# Patient Record
Sex: MALE | Race: WHITE | NOT HISPANIC OR LATINO | Employment: FULL TIME | URBAN - METROPOLITAN AREA
[De-identification: names, ages, dates, MRNs, and addresses within clinical notes are randomized per-mention and may not be internally consistent; named-entity substitution may affect disease eponyms.]

---

## 2017-05-04 ENCOUNTER — APPOINTMENT (OUTPATIENT)
Dept: LAB | Facility: CLINIC | Age: 42
End: 2017-05-04
Payer: COMMERCIAL

## 2017-05-04 ENCOUNTER — TRANSCRIBE ORDERS (OUTPATIENT)
Dept: LAB | Facility: CLINIC | Age: 42
End: 2017-05-04

## 2017-05-04 DIAGNOSIS — E78.6 LIPOPROTEIN DEFICIENCY: ICD-10-CM

## 2017-05-04 LAB
ALT SERPL W P-5'-P-CCNC: 21 U/L (ref 12–78)
AST SERPL W P-5'-P-CCNC: 10 U/L (ref 5–45)
CHOLEST SERPL-MCNC: 178 MG/DL (ref 50–200)
HDLC SERPL-MCNC: 41 MG/DL (ref 40–60)
LDLC SERPL CALC-MCNC: 112 MG/DL (ref 0–100)
TRIGL SERPL-MCNC: 127 MG/DL

## 2017-05-04 PROCEDURE — 80061 LIPID PANEL: CPT

## 2017-05-04 PROCEDURE — 84450 TRANSFERASE (AST) (SGOT): CPT

## 2017-05-04 PROCEDURE — 84460 ALANINE AMINO (ALT) (SGPT): CPT

## 2017-05-04 PROCEDURE — 36415 COLL VENOUS BLD VENIPUNCTURE: CPT

## 2017-05-08 ENCOUNTER — ALLSCRIPTS OFFICE VISIT (OUTPATIENT)
Dept: OTHER | Facility: OTHER | Age: 42
End: 2017-05-08

## 2017-05-08 DIAGNOSIS — R53.81 OTHER MALAISE: ICD-10-CM

## 2017-05-08 DIAGNOSIS — R53.83 OTHER FATIGUE: ICD-10-CM

## 2017-05-17 ENCOUNTER — GENERIC CONVERSION - ENCOUNTER (OUTPATIENT)
Dept: OTHER | Facility: OTHER | Age: 42
End: 2017-05-17

## 2017-05-17 ENCOUNTER — TRANSCRIBE ORDERS (OUTPATIENT)
Dept: LAB | Facility: CLINIC | Age: 42
End: 2017-05-17

## 2017-05-17 ENCOUNTER — APPOINTMENT (OUTPATIENT)
Dept: LAB | Facility: CLINIC | Age: 42
End: 2017-05-17
Payer: COMMERCIAL

## 2017-05-17 DIAGNOSIS — R53.81 OTHER MALAISE: ICD-10-CM

## 2017-05-17 DIAGNOSIS — R53.83 OTHER FATIGUE: ICD-10-CM

## 2017-05-17 LAB
ANION GAP SERPL CALCULATED.3IONS-SCNC: 6 MMOL/L (ref 4–13)
BASOPHILS # BLD AUTO: 0.04 THOUSANDS/ΜL (ref 0–0.1)
BASOPHILS NFR BLD AUTO: 1 % (ref 0–1)
BUN SERPL-MCNC: 14 MG/DL (ref 5–25)
CALCIUM SERPL-MCNC: 9.5 MG/DL (ref 8.3–10.1)
CHLORIDE SERPL-SCNC: 107 MMOL/L (ref 100–108)
CO2 SERPL-SCNC: 29 MMOL/L (ref 21–32)
CREAT SERPL-MCNC: 0.94 MG/DL (ref 0.6–1.3)
EOSINOPHIL # BLD AUTO: 0.28 THOUSAND/ΜL (ref 0–0.61)
EOSINOPHIL NFR BLD AUTO: 4 % (ref 0–6)
ERYTHROCYTE [DISTWIDTH] IN BLOOD BY AUTOMATED COUNT: 12.7 % (ref 11.6–15.1)
GFR SERPL CREATININE-BSD FRML MDRD: >60 ML/MIN/1.73SQ M
GLUCOSE P FAST SERPL-MCNC: 69 MG/DL (ref 65–99)
HCT VFR BLD AUTO: 46.8 % (ref 36.5–49.3)
HGB BLD-MCNC: 16.1 G/DL (ref 12–17)
LYMPHOCYTES # BLD AUTO: 1.9 THOUSANDS/ΜL (ref 0.6–4.47)
LYMPHOCYTES NFR BLD AUTO: 29 % (ref 14–44)
MCH RBC QN AUTO: 29.7 PG (ref 26.8–34.3)
MCHC RBC AUTO-ENTMCNC: 34.4 G/DL (ref 31.4–37.4)
MCV RBC AUTO: 86 FL (ref 82–98)
MONOCYTES # BLD AUTO: 0.33 THOUSAND/ΜL (ref 0.17–1.22)
MONOCYTES NFR BLD AUTO: 5 % (ref 4–12)
NEUTROPHILS # BLD AUTO: 4.09 THOUSANDS/ΜL (ref 1.85–7.62)
NEUTS SEG NFR BLD AUTO: 61 % (ref 43–75)
NRBC BLD AUTO-RTO: 0 /100 WBCS
PLATELET # BLD AUTO: 203 THOUSANDS/UL (ref 149–390)
PMV BLD AUTO: 11.3 FL (ref 8.9–12.7)
POTASSIUM SERPL-SCNC: 3.7 MMOL/L (ref 3.5–5.3)
RBC # BLD AUTO: 5.43 MILLION/UL (ref 3.88–5.62)
SODIUM SERPL-SCNC: 142 MMOL/L (ref 136–145)
TSH SERPL DL<=0.05 MIU/L-ACNC: 1.87 UIU/ML (ref 0.36–3.74)
WBC # BLD AUTO: 6.65 THOUSAND/UL (ref 4.31–10.16)

## 2017-05-17 PROCEDURE — 84403 ASSAY OF TOTAL TESTOSTERONE: CPT

## 2017-05-17 PROCEDURE — 36415 COLL VENOUS BLD VENIPUNCTURE: CPT

## 2017-05-17 PROCEDURE — 80048 BASIC METABOLIC PNL TOTAL CA: CPT

## 2017-05-17 PROCEDURE — 84402 ASSAY OF FREE TESTOSTERONE: CPT

## 2017-05-17 PROCEDURE — 85025 COMPLETE CBC W/AUTO DIFF WBC: CPT

## 2017-05-17 PROCEDURE — 84443 ASSAY THYROID STIM HORMONE: CPT

## 2017-05-19 ENCOUNTER — GENERIC CONVERSION - ENCOUNTER (OUTPATIENT)
Dept: OTHER | Facility: OTHER | Age: 42
End: 2017-05-19

## 2017-05-19 LAB
TESTOST FREE SERPL-MCNC: 7.6 PG/ML (ref 6.8–21.5)
TESTOST SERPL-MCNC: 340 NG/DL (ref 348–1197)
TESTOSTERONE COMMENT: ABNORMAL

## 2017-05-22 ENCOUNTER — ALLSCRIPTS OFFICE VISIT (OUTPATIENT)
Dept: OTHER | Facility: OTHER | Age: 42
End: 2017-05-22

## 2017-05-22 PROCEDURE — 88305 TISSUE EXAM BY PATHOLOGIST: CPT | Performed by: FAMILY MEDICINE

## 2017-05-23 ENCOUNTER — LAB REQUISITION (OUTPATIENT)
Dept: LAB | Facility: HOSPITAL | Age: 42
End: 2017-05-23
Payer: COMMERCIAL

## 2017-05-23 DIAGNOSIS — L98.9 DISORDER OF SKIN OR SUBCUTANEOUS TISSUE: ICD-10-CM

## 2017-05-30 ENCOUNTER — ALLSCRIPTS OFFICE VISIT (OUTPATIENT)
Dept: OTHER | Facility: OTHER | Age: 42
End: 2017-05-30

## 2017-11-22 DIAGNOSIS — E29.1 TESTICULAR HYPOFUNCTION: ICD-10-CM

## 2018-01-10 NOTE — RESULT NOTES
Verified Results  (1) TESTOSTERONE, FREE (DIRECT) AND TOTAL [id number] 08:57AM Jim Monroy Order Number: [id number]     Test Name Result Flag Reference   FREE TESTOSTERONE, DIRECT 7 6 pg/mL  6 8 - 21 5   COMMENT Comment     Adult male reference interval is based on a population of lean males  up to 36years old     TESTOSTERONE (TOTAL) 340 ng/dL L 348 - 1197   Performed at:  [address]  [id number]  : Koko Torres MD, Phone:  [phone]

## 2018-01-13 VITALS
HEART RATE: 70 BPM | HEIGHT: 73 IN | TEMPERATURE: 97 F | SYSTOLIC BLOOD PRESSURE: 122 MMHG | BODY MASS INDEX: 26.11 KG/M2 | OXYGEN SATURATION: 98 % | WEIGHT: 197 LBS | DIASTOLIC BLOOD PRESSURE: 80 MMHG | RESPIRATION RATE: 12 BRPM

## 2018-01-13 NOTE — RESULT NOTES
Verified Results  (1) TSH WITH FT4 REFLEX [id number] 08:57AM Alexandra Denominational Order Number: [id number]     Test Name Result Flag Reference   TSH 1 870 uIU/mL  0 358-3 740   Patients undergoing fluorescein dye angiography may retain small amounts of fluorescein in the body for 48-72 hours post procedure  Samples containing fluorescein can produce falsely depressed TSH values  If the patient had this procedure,a specimen should be resubmitted post fluorescein clearance

## 2018-01-14 VITALS
BODY MASS INDEX: 26.24 KG/M2 | WEIGHT: 198.03 LBS | HEART RATE: 77 BPM | OXYGEN SATURATION: 97 % | TEMPERATURE: 96.5 F | SYSTOLIC BLOOD PRESSURE: 118 MMHG | RESPIRATION RATE: 12 BRPM | DIASTOLIC BLOOD PRESSURE: 76 MMHG | HEIGHT: 73 IN

## 2018-01-15 VITALS
SYSTOLIC BLOOD PRESSURE: 110 MMHG | DIASTOLIC BLOOD PRESSURE: 80 MMHG | WEIGHT: 196 LBS | RESPIRATION RATE: 14 BRPM | BODY MASS INDEX: 25.98 KG/M2 | HEIGHT: 73 IN | OXYGEN SATURATION: 99 % | HEART RATE: 61 BPM | TEMPERATURE: 96.5 F

## 2018-06-13 ENCOUNTER — OFFICE VISIT (OUTPATIENT)
Dept: FAMILY MEDICINE CLINIC | Facility: CLINIC | Age: 43
End: 2018-06-13
Payer: COMMERCIAL

## 2018-06-13 VITALS
HEIGHT: 73 IN | WEIGHT: 194.6 LBS | DIASTOLIC BLOOD PRESSURE: 74 MMHG | SYSTOLIC BLOOD PRESSURE: 120 MMHG | HEART RATE: 85 BPM | TEMPERATURE: 96.5 F | OXYGEN SATURATION: 98 % | BODY MASS INDEX: 25.79 KG/M2

## 2018-06-13 DIAGNOSIS — K21.9 GASTROESOPHAGEAL REFLUX DISEASE WITHOUT ESOPHAGITIS: ICD-10-CM

## 2018-06-13 DIAGNOSIS — E78.2 MIXED HYPERLIPIDEMIA: ICD-10-CM

## 2018-06-13 DIAGNOSIS — Z12.11 COLON CANCER SCREENING: ICD-10-CM

## 2018-06-13 DIAGNOSIS — J06.9 UPPER RESPIRATORY TRACT INFECTION, UNSPECIFIED TYPE: Primary | ICD-10-CM

## 2018-06-13 DIAGNOSIS — F32.A ANXIETY AND DEPRESSION: ICD-10-CM

## 2018-06-13 DIAGNOSIS — F41.9 ANXIETY AND DEPRESSION: ICD-10-CM

## 2018-06-13 PROCEDURE — 99214 OFFICE O/P EST MOD 30 MIN: CPT | Performed by: FAMILY MEDICINE

## 2018-06-13 RX ORDER — SERTRALINE HYDROCHLORIDE 100 MG/1
1 TABLET, FILM COATED ORAL DAILY
COMMUNITY
Start: 2018-03-01 | End: 2018-06-13 | Stop reason: SDUPTHER

## 2018-06-13 RX ORDER — OMEPRAZOLE 40 MG/1
CAPSULE, DELAYED RELEASE ORAL
COMMUNITY
Start: 2018-03-18 | End: 2018-06-13 | Stop reason: SDUPTHER

## 2018-06-13 RX ORDER — SERTRALINE HYDROCHLORIDE 100 MG/1
100 TABLET, FILM COATED ORAL DAILY
Qty: 30 TABLET | Refills: 3 | Status: SHIPPED | OUTPATIENT
Start: 2018-06-13 | End: 2019-04-05 | Stop reason: SDUPTHER

## 2018-06-13 RX ORDER — AZITHROMYCIN 250 MG/1
TABLET, FILM COATED ORAL
Qty: 6 TABLET | Refills: 0 | Status: SHIPPED | OUTPATIENT
Start: 2018-06-13 | End: 2018-06-17

## 2018-06-13 RX ORDER — OMEPRAZOLE 40 MG/1
40 CAPSULE, DELAYED RELEASE ORAL DAILY
Qty: 30 CAPSULE | Refills: 3 | Status: SHIPPED | OUTPATIENT
Start: 2018-06-13 | End: 2018-10-11 | Stop reason: SDUPTHER

## 2018-06-13 NOTE — PROGRESS NOTES
Assessment/Plan:         Diagnoses and all orders for this visit:    Upper respiratory tract infection, unspecified type  -     azithromycin (ZITHROMAX) 250 mg tablet; Take 2 tablets today then 1 tablet daily x 4 days  -     omeprazole (PriLOSEC) 40 MG capsule; Take 1 capsule (40 mg total) by mouth daily  -     CBC and differential; Future  -     Comprehensive metabolic panel; Future  -     Lipid panel; Future  -     TSH, 3rd generation; Future    Anxiety and depression  -     sertraline (ZOLOFT) 100 mg tablet; Take 1 tablet (100 mg total) by mouth daily    Gastroesophageal reflux disease without esophagitis  -     CBC and differential; Future  -     Comprehensive metabolic panel; Future  -     Lipid panel; Future  -     TSH, 3rd generation; Future    Mixed hyperlipidemia  -     CBC and differential; Future  -     Comprehensive metabolic panel; Future  -     Lipid panel; Future  -     TSH, 3rd generation; Future    Colon cancer screening  -     Occult Bloood,Fecal Immunochemical; Future    Other orders  -     Discontinue: omeprazole (PriLOSEC) 40 MG capsule;   -     Discontinue: sertraline (ZOLOFT) 100 mg tablet; Take 1 tablet by mouth daily        URI      discussed plan of care , rec preventative measures , if past issues with seasonal allergens rec aggressive treatment , lambert pot , nasal rinse   decongestant for short term treatment , muccinex    increase fluids, rest, call for any changes increase in symptoms     GERD   Discussed weaning protocol encourage diet modifications food avoidance program,  Discussed due to length of being on the present medication may be difficult to completely come off recommending following protocol decreasing dose by 50% and if at all possible going to p r n  Use of medication    Next    Depression  / anxiety   Stable, to continue current medications     hyperlipidemia, mild   Will obtain current lab work discussed diet modification continue current exercise program,  when well      Subjective:      Patient ID: Dequan Bueno is a 38 y o  male  Cough and st , has been going on for the past week   Initially was worse   Cough worse in the morning and as the day progresses ,   + ill contact   Works as a [employer]   Neg smoker   Takes  Med for the gerd   Has been taking for number of years approximately 5, has at times missed doses has not made any change in diet, reviewed   Anxiety  Depression  Continues to take Zoloft 50 mg daily has been doing quite well with current medications, feels no need to increase would like to maintain the current levels  Has not had lab work this year needs a new request  Negative chest pain palpitations shortness of breath difficulty breathing cough lesions rash          The following portions of the patient's history were reviewed and updated as appropriate:   He has no past medical history on file  ,   does not have any pertinent problems on file  ,   has no past surgical history on file  ,  family history is not on file  ,   reports that he has never smoked  He has never used smokeless tobacco  He reports that he drinks alcohol  He reports that he does not use drugs  ,  is allergic to ciprofloxacin         Review of Systems   Constitutional: Negative for appetite change, chills, fever and unexpected weight change  HENT: Negative for congestion, dental problem, ear pain, hearing loss, postnasal drip, rhinorrhea, sinus pain, sinus pressure, sneezing, sore throat, tinnitus and voice change  Eyes: Negative for visual disturbance  Respiratory: Positive for cough  Negative for apnea, chest tightness, shortness of breath and wheezing  Cardiovascular: Negative for chest pain, palpitations and leg swelling  Gastrointestinal: Negative for abdominal pain, blood in stool, constipation, diarrhea, nausea and vomiting  Endocrine: Negative for cold intolerance, heat intolerance, polydipsia, polyphagia and polyuria     Genitourinary: Negative for decreased urine volume, difficulty urinating, dysuria, frequency and hematuria  Musculoskeletal: Negative for arthralgias, back pain, gait problem, joint swelling and myalgias  Skin: Negative for color change, rash and wound  Allergic/Immunologic: Negative for environmental allergies and food allergies  Neurological: Negative for dizziness, syncope, weakness, light-headedness, numbness and headaches  Hematological: Negative for adenopathy  Does not bruise/bleed easily  Psychiatric/Behavioral: Negative for sleep disturbance and suicidal ideas  The patient is not nervous/anxious  Objective:  Vitals:    06/13/18 0913   BP: 120/74   Pulse: 85   Temp: (!) 96 5 °F (35 8 °C)   SpO2: 98%      Physical Exam   Constitutional: He is oriented to person, place, and time  He appears well-developed and well-nourished  HENT:   Head: Normocephalic and atraumatic  Right Ear: External ear normal    Left Ear: External ear normal    Mouth/Throat: Oropharynx is clear and moist    Eyes: Conjunctivae are normal    Neck: Normal range of motion  Neck supple  Cardiovascular: Normal rate, regular rhythm and normal heart sounds  Pulmonary/Chest: Effort normal and breath sounds normal    Scattered clears with cough negative forced expiratory wheeze   Musculoskeletal: Normal range of motion  Neurological: He is oriented to person, place, and time  Skin: Skin is warm and dry  Psychiatric: He has a normal mood and affect   His behavior is normal  Judgment and thought content normal

## 2018-06-15 ENCOUNTER — TRANSCRIBE ORDERS (OUTPATIENT)
Dept: LAB | Facility: CLINIC | Age: 43
End: 2018-06-15

## 2018-06-15 ENCOUNTER — APPOINTMENT (OUTPATIENT)
Dept: LAB | Facility: CLINIC | Age: 43
End: 2018-06-15
Payer: COMMERCIAL

## 2018-06-15 DIAGNOSIS — J06.9 UPPER RESPIRATORY TRACT INFECTION, UNSPECIFIED TYPE: ICD-10-CM

## 2018-06-15 DIAGNOSIS — K58.9 IRRITABLE BOWEL SYNDROME, UNSPECIFIED TYPE: Primary | ICD-10-CM

## 2018-06-15 DIAGNOSIS — K21.9 GASTROESOPHAGEAL REFLUX DISEASE WITHOUT ESOPHAGITIS: ICD-10-CM

## 2018-06-15 DIAGNOSIS — Z12.11 COLON CANCER SCREENING: ICD-10-CM

## 2018-06-15 DIAGNOSIS — E78.2 MIXED HYPERLIPIDEMIA: ICD-10-CM

## 2018-06-15 LAB
ALBUMIN SERPL BCP-MCNC: 3.8 G/DL (ref 3.5–5)
ALP SERPL-CCNC: 69 U/L (ref 46–116)
ALT SERPL W P-5'-P-CCNC: 19 U/L (ref 12–78)
ANION GAP SERPL CALCULATED.3IONS-SCNC: 6 MMOL/L (ref 4–13)
AST SERPL W P-5'-P-CCNC: 14 U/L (ref 5–45)
BASOPHILS # BLD MANUAL: 0.07 THOUSAND/UL (ref 0–0.1)
BASOPHILS NFR MAR MANUAL: 1 % (ref 0–1)
BILIRUB SERPL-MCNC: 0.66 MG/DL (ref 0.2–1)
BUN SERPL-MCNC: 16 MG/DL (ref 5–25)
CALCIUM SERPL-MCNC: 9 MG/DL (ref 8.3–10.1)
CHLORIDE SERPL-SCNC: 106 MMOL/L (ref 100–108)
CHOLEST SERPL-MCNC: 143 MG/DL (ref 50–200)
CO2 SERPL-SCNC: 28 MMOL/L (ref 21–32)
CREAT SERPL-MCNC: 0.86 MG/DL (ref 0.6–1.3)
EOSINOPHIL # BLD MANUAL: 0.26 THOUSAND/UL (ref 0–0.4)
EOSINOPHIL NFR BLD MANUAL: 4 % (ref 0–6)
ERYTHROCYTE [DISTWIDTH] IN BLOOD BY AUTOMATED COUNT: 12.4 % (ref 11.6–15.1)
ERYTHROCYTE [SEDIMENTATION RATE] IN BLOOD: 8 MM/HOUR (ref 0–10)
GFR SERPL CREATININE-BSD FRML MDRD: 105 ML/MIN/1.73SQ M
GLUCOSE P FAST SERPL-MCNC: 76 MG/DL (ref 65–99)
HCT VFR BLD AUTO: 46.2 % (ref 36.5–49.3)
HDLC SERPL-MCNC: 37 MG/DL (ref 40–60)
HGB BLD-MCNC: 15.4 G/DL (ref 12–17)
LDLC SERPL CALC-MCNC: 83 MG/DL (ref 0–100)
LYMPHOCYTES # BLD AUTO: 2.17 THOUSAND/UL (ref 0.6–4.47)
LYMPHOCYTES # BLD AUTO: 33 % (ref 14–44)
MCH RBC QN AUTO: 29.4 PG (ref 26.8–34.3)
MCHC RBC AUTO-ENTMCNC: 33.3 G/DL (ref 31.4–37.4)
MCV RBC AUTO: 88 FL (ref 82–98)
METAMYELOCYTES NFR BLD MANUAL: 1 % (ref 0–1)
MONOCYTES # BLD AUTO: 0.33 THOUSAND/UL (ref 0–1.22)
MONOCYTES NFR BLD: 5 % (ref 4–12)
NEUTROPHILS # BLD MANUAL: 3.36 THOUSAND/UL (ref 1.85–7.62)
NEUTS SEG NFR BLD AUTO: 51 % (ref 43–75)
NONHDLC SERPL-MCNC: 106 MG/DL
NRBC BLD AUTO-RTO: 0 /100 WBCS
PLATELET # BLD AUTO: 223 THOUSANDS/UL (ref 149–390)
PLATELET BLD QL SMEAR: ADEQUATE
PMV BLD AUTO: 11.7 FL (ref 8.9–12.7)
POIKILOCYTOSIS BLD QL SMEAR: PRESENT
POTASSIUM SERPL-SCNC: 4 MMOL/L (ref 3.5–5.3)
PROT SERPL-MCNC: 7.6 G/DL (ref 6.4–8.2)
RBC # BLD AUTO: 5.24 MILLION/UL (ref 3.88–5.62)
RBC MORPH BLD: PRESENT
SODIUM SERPL-SCNC: 140 MMOL/L (ref 136–145)
TRIGL SERPL-MCNC: 117 MG/DL
TSH SERPL DL<=0.05 MIU/L-ACNC: 1.7 UIU/ML (ref 0.36–3.74)
VARIANT LYMPHS # BLD AUTO: 5 %
WBC # BLD AUTO: 6.59 THOUSAND/UL (ref 4.31–10.16)

## 2018-06-15 PROCEDURE — 83516 IMMUNOASSAY NONANTIBODY: CPT

## 2018-06-15 PROCEDURE — 85027 COMPLETE CBC AUTOMATED: CPT

## 2018-06-15 PROCEDURE — 80053 COMPREHEN METABOLIC PANEL: CPT

## 2018-06-15 PROCEDURE — 85007 BL SMEAR W/DIFF WBC COUNT: CPT

## 2018-06-15 PROCEDURE — 85652 RBC SED RATE AUTOMATED: CPT

## 2018-06-15 PROCEDURE — 36415 COLL VENOUS BLD VENIPUNCTURE: CPT

## 2018-06-15 PROCEDURE — 84443 ASSAY THYROID STIM HORMONE: CPT

## 2018-06-15 PROCEDURE — 80061 LIPID PANEL: CPT

## 2018-06-16 LAB
TTG IGA SER-ACNC: <2 U/ML (ref 0–3)
TTG IGG SER-ACNC: 2 U/ML (ref 0–5)

## 2018-06-26 ENCOUNTER — OFFICE VISIT (OUTPATIENT)
Dept: FAMILY MEDICINE CLINIC | Facility: CLINIC | Age: 43
End: 2018-06-26
Payer: COMMERCIAL

## 2018-06-26 VITALS
BODY MASS INDEX: 25.31 KG/M2 | DIASTOLIC BLOOD PRESSURE: 74 MMHG | WEIGHT: 191 LBS | HEART RATE: 68 BPM | OXYGEN SATURATION: 98 % | SYSTOLIC BLOOD PRESSURE: 122 MMHG | TEMPERATURE: 97.1 F | HEIGHT: 73 IN

## 2018-06-26 DIAGNOSIS — F32.A ANXIETY AND DEPRESSION: Primary | ICD-10-CM

## 2018-06-26 DIAGNOSIS — K21.9 GASTROESOPHAGEAL REFLUX DISEASE WITHOUT ESOPHAGITIS: ICD-10-CM

## 2018-06-26 DIAGNOSIS — F41.9 ANXIETY AND DEPRESSION: Primary | ICD-10-CM

## 2018-06-26 PROCEDURE — 1036F TOBACCO NON-USER: CPT | Performed by: FAMILY MEDICINE

## 2018-06-26 PROCEDURE — 99213 OFFICE O/P EST LOW 20 MIN: CPT | Performed by: FAMILY MEDICINE

## 2018-06-26 PROCEDURE — 3008F BODY MASS INDEX DOCD: CPT | Performed by: FAMILY MEDICINE

## 2018-06-26 NOTE — PROGRESS NOTES
Assessment/Plan:         Diagnoses and all orders for this visit:    Anxiety and depression    Gastroesophageal reflux disease without esophagitis       GERD   Discussed plan of care encourage continued treatment plan, diet modification and choices   Anxiety/depression   stable, has been on current treatment plan doing well no change in medicines continues to take sertraline 50 mg once a day, no issues concerns        Subjective:      Patient ID: Columba Chavez is a 38 y o  male  F/u labs   Still Taking the Prilosec but only on an as-needed basis at this point watching diet has had no issues concerns next    Taking the sertraline consistently doing well has been on for a number years finds no reason to come off it levels things out last stress   Find no reason to change next   has been actively exercising more going to the gym, feeling good next  Negative chest pain palpitations shortness of breath difficulty breathing cough lesions rash            The following portions of the patient's history were reviewed and updated as appropriate:   He has a past medical history of Benign neoplasm of large intestine; Black tarry stools; Depression; and Disease of thyroid gland  ,   does not have any pertinent problems on file  ,   has a past surgical history that includes Thyroidectomy, partial ,  family history includes Coronary artery disease in his father; Heart attack in his father  ,   reports that he has never smoked  He has never used smokeless tobacco  He reports that he drinks alcohol  He reports that he does not use drugs  ,  is allergic to ciprofloxacin         Review of Systems   Constitutional: Negative for appetite change, chills, fever and unexpected weight change  HENT: Negative for congestion, dental problem, ear pain, hearing loss, postnasal drip, rhinorrhea, sinus pain, sinus pressure, sneezing, sore throat, tinnitus and voice change  Eyes: Negative for visual disturbance     Respiratory: Negative for apnea, cough, chest tightness and shortness of breath  Cardiovascular: Negative for chest pain, palpitations and leg swelling  Gastrointestinal: Negative for abdominal pain, blood in stool, constipation, diarrhea, nausea and vomiting  Endocrine: Negative for cold intolerance, heat intolerance, polydipsia, polyphagia and polyuria  Genitourinary: Negative for decreased urine volume, difficulty urinating, dysuria, frequency and hematuria  Musculoskeletal: Negative for arthralgias, back pain, gait problem, joint swelling and myalgias  Skin: Negative for color change, rash and wound  Allergic/Immunologic: Negative for environmental allergies and food allergies  Neurological: Negative for dizziness, syncope, weakness, light-headedness, numbness and headaches  Hematological: Negative for adenopathy  Does not bruise/bleed easily  Psychiatric/Behavioral: Negative for sleep disturbance and suicidal ideas  The patient is not nervous/anxious  Objective:  Vitals:    06/26/18 1407   BP: 122/74   Pulse: 68   Temp: (!) 97 1 °F (36 2 °C)   SpO2: 98%      Physical Exam   Constitutional: He is oriented to person, place, and time  He appears well-developed and well-nourished  HENT:   Head: Normocephalic and atraumatic  Eyes: EOM are normal    Neck: Normal range of motion  Neck supple  Cardiovascular: Normal rate, regular rhythm and normal heart sounds  Pulmonary/Chest: Effort normal and breath sounds normal    Musculoskeletal: Normal range of motion  Neurological: He is alert and oriented to person, place, and time  Skin: Skin is warm and dry  Psychiatric: He has a normal mood and affect   His behavior is normal  Judgment and thought content normal

## 2018-10-11 DIAGNOSIS — J06.9 UPPER RESPIRATORY TRACT INFECTION, UNSPECIFIED TYPE: ICD-10-CM

## 2018-10-11 RX ORDER — OMEPRAZOLE 40 MG/1
CAPSULE, DELAYED RELEASE ORAL
Qty: 30 CAPSULE | Refills: 3 | Status: SHIPPED | OUTPATIENT
Start: 2018-10-11 | End: 2019-06-03 | Stop reason: ALTCHOICE

## 2019-01-11 ENCOUNTER — TELEPHONE (OUTPATIENT)
Dept: GASTROENTEROLOGY | Facility: CLINIC | Age: 44
End: 2019-01-11

## 2019-01-18 RX ORDER — A/SINGAPORE/GP1908/2015 IVR-180 (H1N1) (AN A/MICHIGAN/45/2015-LIKE VIRUS), A/SINGAPORE/GP2050/2015 (H3N2) (AN A/HONG KONG/4801/2014 - LIKE VIRUS), B/UTAH/9/2014 (A B/PHUKET/3073/2013-LIKE VIRUS), B/HONG KONG/259/2010 (A B/BRISBANE/60/08-LIKE VIRUS) 15; 15; 15; 15 UG/.5ML; UG/.5ML; UG/.5ML; UG/.5ML
INJECTION, SUSPENSION INTRAMUSCULAR
Refills: 0 | COMMUNITY
Start: 2018-11-17 | End: 2019-06-03

## 2019-01-23 ENCOUNTER — OFFICE VISIT (OUTPATIENT)
Dept: GASTROENTEROLOGY | Facility: CLINIC | Age: 44
End: 2019-01-23
Payer: COMMERCIAL

## 2019-01-23 VITALS
SYSTOLIC BLOOD PRESSURE: 122 MMHG | HEART RATE: 72 BPM | BODY MASS INDEX: 26.04 KG/M2 | DIASTOLIC BLOOD PRESSURE: 82 MMHG | WEIGHT: 197.4 LBS

## 2019-01-23 DIAGNOSIS — K58.0 IRRITABLE BOWEL SYNDROME WITH DIARRHEA: Primary | ICD-10-CM

## 2019-01-23 DIAGNOSIS — K21.9 GASTROESOPHAGEAL REFLUX DISEASE WITHOUT ESOPHAGITIS: ICD-10-CM

## 2019-01-23 PROCEDURE — 99214 OFFICE O/P EST MOD 30 MIN: CPT | Performed by: INTERNAL MEDICINE

## 2019-01-23 NOTE — PATIENT INSTRUCTIONS
Use Imodium 1 tablet BID  Start Benefiber 1 tablespoon twice daily  Start Align (Bifidobacterium Infantis) - Probiotic daily    If no relief or adverse response can try Xifaxan for Irritable Bowel Syndrome

## 2019-01-23 NOTE — PROGRESS NOTES
[facility] - Outpatient Follow-up Note  Isaías Arias 37 y o  male MRN: [id number]  Encounter: [id number]          ASSESSMENT AND PLAN:      1  Irritable bowel syndrome with diarrhea  Long history s/p negative colonoscopy in 2016  Trial of Imodium  If no response can try Xifaxan  Start fiber and probiotic  Has rectal bleeding related to a hemorrhoid - start anusol cream BID x 14 days  2  Gastroesophageal reflux disease without esophagitis  Chronic on Prilosec 20mg once daily with relatively good relief      ______________________________________________________________________    SUBJECTIVE:  37year old male with a history of IBS-D presents with worsening diarrhea and recent rectal bleeding with BMs  He is having 3-4 loose stools daily  In 2016 he had a normal EGD and colonoscopy with only a hyperplastic polyp  He was tested for Celiac disease which was negative  He does feel external hemorrhoids which can be painful  There is no significant abdominal pain, nausea or vomiting  He tried Claudia Yesy in the past which caused dizziness so he stopped  Also with GERD responding to Omeprazole 20mg daily  He denies dypshagia or hematemesis  REVIEW OF SYSTEMS IS OTHERWISE NEGATIVE        Historical Information   Past Medical History:   Diagnosis Date    Benign neoplasm of large intestine     Black tarry stools     Depression     Disease of thyroid gland      Past Surgical History:   Procedure Laterality Date    THYROIDECTOMY, PARTIAL       Social History   History   Alcohol Use    Yes     Comment: Socially     History   Drug Use No     History   Smoking Status    Never Smoker   Smokeless Tobacco    Never Used     Family History   Problem Relation Age of Onset    Coronary artery disease Father     Heart attack Father     Hypertension Father     No Known Problems Mother        Meds/Allergies       Current Outpatient Prescriptions:     FLUCELVAX QUADRIVALENT    omeprazole (PriLOSEC) 40 MG capsule    sertraline (ZOLOFT) 100 mg tablet    Allergies   Allergen Reactions    Ciprofloxacin Palpitations           Objective     Blood pressure 122/82, pulse 72, weight 89 5 kg (197 lb 6 4 oz)  Body mass index is 26 04 kg/m²  PHYSICAL EXAM:      General Appearance:   Alert, cooperative, no distress   HEENT:   Normocephalic, atraumatic, anicteric      Neck:  Supple, symmetrical, trachea midline   Lungs:   Clear to auscultation bilaterally; no rales, rhonchi or wheezing; respirations unlabored    Heart[de-identified]   Regular rate and rhythm; no murmur, rub, or gallop  Abdomen:   Soft, non-tender, non-distended; normal bowel sounds; no masses, no organomegaly    Genitalia:   Deferred    Rectal:   Deferred    Extremities:  No cyanosis, clubbing or edema    Pulses:  2+ and symmetric    Skin:  No jaundice, rashes, or lesions    Lymph nodes:  No palpable cervical lymphadenopathy        Lab Results:   No visits with results within 1 Day(s) from this visit     Latest known visit with results is:   Transcribe Orders on 06/15/2018   Component Date Value    WBC 06/15/2018 6 59     RBC 06/15/2018 5 24     Hemoglobin 06/15/2018 15 4     Hematocrit 06/15/2018 46 2     MCV 06/15/2018 88     MCH 06/15/2018 29 4     MCHC 06/15/2018 33 3     RDW 06/15/2018 12 4     MPV 06/15/2018 11 7     Platelets 49/14/0924 223     nRBC 06/15/2018 0     Sodium 06/15/2018 140     Potassium 06/15/2018 4 0     Chloride 06/15/2018 106     CO2 06/15/2018 28     ANION GAP 06/15/2018 6     BUN 06/15/2018 16     Creatinine 06/15/2018 0 86     Glucose, Fasting 06/15/2018 76     Calcium 06/15/2018 9 0     AST 06/15/2018 14     ALT 06/15/2018 19     Alkaline Phosphatase 06/15/2018 69     Total Protein 06/15/2018 7 6     Albumin 06/15/2018 3 8     Total Bilirubin 06/15/2018 0 66     eGFR 06/15/2018 105     Cholesterol 06/15/2018 143     Triglycerides 06/15/2018 117     HDL, Direct 06/15/2018 37*    LDL Calculated 06/15/2018 83     Non-HDL-Chol (CHOL-HDL) 06/15/2018 106     Sed Rate 06/15/2018 8     TSH 3RD GENERATON 06/15/2018 1 700     TISSUE TRANSGLUTAMINASE * 06/15/2018 <2     Tissue Transglut Ab IGG 06/15/2018 2     Segmented % 06/15/2018 51     Lymphocytes % 06/15/2018 33     Monocytes % 06/15/2018 5     Eosinophils, % 06/15/2018 4     Basophils % 06/15/2018 1     Metamyelocytes% 06/15/2018 1     Atypical Lymphocytes % 06/15/2018 5*    Absolute Neutrophils 06/15/2018 3 36     Lymphocytes Absolute 06/15/2018 2 17     Monocytes Absolute 06/15/2018 0 33     Eosinophils Absolute 06/15/2018 0 26     Basophils Absolute 06/15/2018 0 07     RBC Morphology 06/15/2018 Present     Poikilocytes 06/15/2018 Present     Platelet Estimate 07/59/2284 Adequate          Radiology Results:   No results found

## 2019-03-20 ENCOUNTER — OFFICE VISIT (OUTPATIENT)
Dept: FAMILY MEDICINE CLINIC | Facility: CLINIC | Age: 44
End: 2019-03-20
Payer: COMMERCIAL

## 2019-03-20 VITALS
HEART RATE: 76 BPM | SYSTOLIC BLOOD PRESSURE: 120 MMHG | RESPIRATION RATE: 16 BRPM | DIASTOLIC BLOOD PRESSURE: 82 MMHG | HEIGHT: 73 IN | BODY MASS INDEX: 26.37 KG/M2 | WEIGHT: 199 LBS | OXYGEN SATURATION: 98 % | TEMPERATURE: 98 F

## 2019-03-20 DIAGNOSIS — B02.9 HERPES ZOSTER WITHOUT COMPLICATION: Primary | ICD-10-CM

## 2019-03-20 PROCEDURE — 99213 OFFICE O/P EST LOW 20 MIN: CPT | Performed by: FAMILY MEDICINE

## 2019-03-20 PROCEDURE — 3008F BODY MASS INDEX DOCD: CPT | Performed by: FAMILY MEDICINE

## 2019-03-20 RX ORDER — VALACYCLOVIR HYDROCHLORIDE 1 G/1
1000 TABLET, FILM COATED ORAL 3 TIMES DAILY
Qty: 21 TABLET | Refills: 0 | Status: SHIPPED | OUTPATIENT
Start: 2019-03-20 | End: 2020-06-22

## 2019-03-20 NOTE — PROGRESS NOTES
Assessment/Plan:         Diagnoses and all orders for this visit:    Herpes zoster without complication  -     valACYclovir (VALTREX) 1,000 mg tablet; Take 1 tablet (1,000 mg total) by mouth 3 (three) times a day for 7 days        Discussed plan of care , call for any changes , instructed on medications, instructed on potential causative factors reviewed history, cautioned against exposure to sun may cover with lose dressing stressed the importance of hand washing      Subjective:      Patient ID: Dmitriy Bonilla is a 37 y o  male  Here for rash  Started approximately 5 days ago lower mid back  Initially slightly itchy now tender, appears to be blisters, slightly red  Denies any warmth, negative fever chills, no apparent crest scale  Previous history chickenpox  Negative history of shingles  No known exposures        The following portions of the patient's history were reviewed and updated as appropriate:   He has a past medical history of Benign neoplasm of large intestine, Black tarry stools, Depression, and Disease of thyroid gland  ,  does not have any pertinent problems on file  ,   has a past surgical history that includes Thyroidectomy, partial ,  family history includes Coronary artery disease in his father; Heart attack in his father; Hypertension in his father; No Known Problems in his mother  ,   reports that he has never smoked  He has never used smokeless tobacco  He reports that he drinks alcohol  He reports that he does not use drugs  ,  is allergic to ciprofloxacin         Review of Systems   Constitutional: Negative for appetite change, chills, fever and unexpected weight change  HENT: Negative for congestion, dental problem, ear pain, hearing loss, postnasal drip, rhinorrhea, sinus pressure, sinus pain, sneezing, sore throat, tinnitus and voice change  Eyes: Negative for visual disturbance  Respiratory: Negative for apnea, cough, chest tightness and shortness of breath      Cardiovascular: Negative for chest pain, palpitations and leg swelling  Gastrointestinal: Negative for abdominal pain, blood in stool, constipation, diarrhea, nausea and vomiting  Endocrine: Negative for cold intolerance, heat intolerance, polydipsia, polyphagia and polyuria  Genitourinary: Negative for decreased urine volume, difficulty urinating, dysuria, frequency and hematuria  Musculoskeletal: Negative for arthralgias, back pain, gait problem, joint swelling and myalgias  Skin: Positive for rash  Negative for color change and wound  Allergic/Immunologic: Negative for environmental allergies and food allergies  Neurological: Negative for dizziness, syncope, weakness, light-headedness, numbness and headaches  Hematological: Negative for adenopathy  Does not bruise/bleed easily  Psychiatric/Behavioral: Negative for sleep disturbance and suicidal ideas  The patient is not nervous/anxious  Objective:  Vitals:    03/20/19 1332   BP: 120/82   Pulse: 76   Resp: 16   Temp: 98 °F (36 7 °C)   SpO2: 98%      Physical Exam   Constitutional: He is oriented to person, place, and time  He appears well-developed and well-nourished  No distress  HENT:   Head: Normocephalic and atraumatic  Eyes: Conjunctivae are normal  No scleral icterus  Neck: Normal range of motion  Cardiovascular: Normal rate  Pulmonary/Chest: Effort normal    Musculoskeletal: Normal range of motion  Neurological: He is oriented to person, place, and time  Skin: Rash noted     Lower back lumbar area right of midline  Crop vesicles red base, tender, negative crest or scale negative warm  Not crossing midline

## 2019-04-05 DIAGNOSIS — F32.A ANXIETY AND DEPRESSION: ICD-10-CM

## 2019-04-05 DIAGNOSIS — F41.9 ANXIETY AND DEPRESSION: ICD-10-CM

## 2019-04-07 RX ORDER — SERTRALINE HYDROCHLORIDE 100 MG/1
TABLET, FILM COATED ORAL
Qty: 30 TABLET | Refills: 3 | Status: SHIPPED | OUTPATIENT
Start: 2019-04-07 | End: 2019-09-09 | Stop reason: SDUPTHER

## 2019-06-03 ENCOUNTER — OFFICE VISIT (OUTPATIENT)
Dept: FAMILY MEDICINE CLINIC | Facility: CLINIC | Age: 44
End: 2019-06-03
Payer: COMMERCIAL

## 2019-06-03 VITALS
DIASTOLIC BLOOD PRESSURE: 80 MMHG | SYSTOLIC BLOOD PRESSURE: 114 MMHG | HEART RATE: 67 BPM | HEIGHT: 73 IN | RESPIRATION RATE: 14 BRPM | OXYGEN SATURATION: 98 % | WEIGHT: 197.2 LBS | BODY MASS INDEX: 26.14 KG/M2 | TEMPERATURE: 97.8 F

## 2019-06-03 DIAGNOSIS — F41.9 ANXIETY AND DEPRESSION: ICD-10-CM

## 2019-06-03 DIAGNOSIS — Z12.11 COLON CANCER SCREENING: ICD-10-CM

## 2019-06-03 DIAGNOSIS — K58.0 IRRITABLE BOWEL SYNDROME WITH DIARRHEA: ICD-10-CM

## 2019-06-03 DIAGNOSIS — Z00.00 PHYSICAL EXAM: Primary | ICD-10-CM

## 2019-06-03 DIAGNOSIS — R06.02 SHORTNESS OF BREATH: ICD-10-CM

## 2019-06-03 DIAGNOSIS — Z13.220 SCREENING, LIPID: ICD-10-CM

## 2019-06-03 DIAGNOSIS — K21.9 GASTROESOPHAGEAL REFLUX DISEASE WITHOUT ESOPHAGITIS: ICD-10-CM

## 2019-06-03 DIAGNOSIS — F32.A ANXIETY AND DEPRESSION: ICD-10-CM

## 2019-06-03 PROCEDURE — 99396 PREV VISIT EST AGE 40-64: CPT | Performed by: FAMILY MEDICINE

## 2019-06-03 RX ORDER — RANITIDINE 150 MG/1
150 CAPSULE ORAL DAILY PRN
Qty: 30 CAPSULE | Refills: 3 | Status: SHIPPED | OUTPATIENT
Start: 2019-06-03 | End: 2020-06-22

## 2019-06-04 ENCOUNTER — TELEPHONE (OUTPATIENT)
Dept: FAMILY MEDICINE CLINIC | Facility: CLINIC | Age: 44
End: 2019-06-04

## 2019-06-04 DIAGNOSIS — F32.A ANXIETY AND DEPRESSION: Primary | ICD-10-CM

## 2019-06-04 DIAGNOSIS — F41.9 ANXIETY AND DEPRESSION: Primary | ICD-10-CM

## 2019-06-20 ENCOUNTER — HOSPITAL ENCOUNTER (OUTPATIENT)
Dept: PULMONOLOGY | Facility: HOSPITAL | Age: 44
Discharge: HOME | End: 2019-06-20
Payer: COMMERCIAL

## 2019-06-20 DIAGNOSIS — R06.02 SHORTNESS OF BREATH: ICD-10-CM

## 2019-06-20 PROCEDURE — 94060 EVALUATION OF WHEEZING: CPT | Performed by: INTERNAL MEDICINE

## 2019-06-20 PROCEDURE — 94060 EVALUATION OF WHEEZING: CPT

## 2019-06-20 PROCEDURE — 94760 N-INVAS EAR/PLS OXIMETRY 1: CPT

## 2019-06-20 PROCEDURE — 94729 DIFFUSING CAPACITY: CPT | Performed by: INTERNAL MEDICINE

## 2019-06-20 PROCEDURE — 94727 GAS DIL/WSHOT DETER LNG VOL: CPT | Performed by: INTERNAL MEDICINE

## 2019-06-20 PROCEDURE — 94729 DIFFUSING CAPACITY: CPT

## 2019-06-20 PROCEDURE — 94727 GAS DIL/WSHOT DETER LNG VOL: CPT

## 2019-06-20 RX ORDER — ALBUTEROL SULFATE 2.5 MG/3ML
2.5 SOLUTION RESPIRATORY (INHALATION) ONCE
Status: COMPLETED | OUTPATIENT
Start: 2019-06-20 | End: 2019-06-20

## 2019-06-20 RX ADMIN — ALBUTEROL SULFATE 2.5 MG: 2.5 SOLUTION RESPIRATORY (INHALATION) at 07:43

## 2019-08-01 ENCOUNTER — APPOINTMENT (OUTPATIENT)
Dept: LAB | Facility: CLINIC | Age: 44
End: 2019-08-01
Payer: COMMERCIAL

## 2019-08-01 ENCOUNTER — TRANSCRIBE ORDERS (OUTPATIENT)
Dept: LAB | Facility: CLINIC | Age: 44
End: 2019-08-01

## 2019-08-01 DIAGNOSIS — F41.9 ANXIETY AND DEPRESSION: ICD-10-CM

## 2019-08-01 DIAGNOSIS — Z12.11 COLON CANCER SCREENING: ICD-10-CM

## 2019-08-01 DIAGNOSIS — F32.A ANXIETY AND DEPRESSION: ICD-10-CM

## 2019-08-01 DIAGNOSIS — Z13.220 SCREENING, LIPID: ICD-10-CM

## 2019-08-01 LAB
ALBUMIN SERPL BCP-MCNC: 3.8 G/DL (ref 3.5–5)
ALP SERPL-CCNC: 62 U/L (ref 46–116)
ALT SERPL W P-5'-P-CCNC: 17 U/L (ref 12–78)
ANION GAP SERPL CALCULATED.3IONS-SCNC: 7 MMOL/L (ref 4–13)
AST SERPL W P-5'-P-CCNC: 11 U/L (ref 5–45)
BACTERIA UR QL AUTO: NORMAL /HPF
BILIRUB SERPL-MCNC: 0.57 MG/DL (ref 0.2–1)
BILIRUB UR QL STRIP: NEGATIVE
BUN SERPL-MCNC: 13 MG/DL (ref 5–25)
CALCIUM SERPL-MCNC: 9 MG/DL (ref 8.3–10.1)
CHLORIDE SERPL-SCNC: 107 MMOL/L (ref 100–108)
CHOLEST SERPL-MCNC: 167 MG/DL (ref 50–200)
CLARITY UR: CLEAR
CO2 SERPL-SCNC: 26 MMOL/L (ref 21–32)
COLOR UR: YELLOW
CREAT SERPL-MCNC: 0.96 MG/DL (ref 0.6–1.3)
CREAT UR-MCNC: 177 MG/DL
GFR SERPL CREATININE-BSD FRML MDRD: 95 ML/MIN/1.73SQ M
GLUCOSE P FAST SERPL-MCNC: 85 MG/DL (ref 65–99)
GLUCOSE UR STRIP-MCNC: NEGATIVE MG/DL
HDLC SERPL-MCNC: 43 MG/DL (ref 40–60)
HGB UR QL STRIP.AUTO: NEGATIVE
HYALINE CASTS #/AREA URNS LPF: NORMAL /LPF
KETONES UR STRIP-MCNC: NEGATIVE MG/DL
LDLC SERPL CALC-MCNC: 106 MG/DL (ref 0–100)
LEUKOCYTE ESTERASE UR QL STRIP: NEGATIVE
MICROALBUMIN UR-MCNC: 6.4 MG/L (ref 0–20)
MICROALBUMIN/CREAT 24H UR: 4 MG/G CREATININE (ref 0–30)
NITRITE UR QL STRIP: NEGATIVE
NON-SQ EPI CELLS URNS QL MICRO: NORMAL /HPF
NONHDLC SERPL-MCNC: 124 MG/DL
PH UR STRIP.AUTO: 7 [PH]
POTASSIUM SERPL-SCNC: 3.7 MMOL/L (ref 3.5–5.3)
PROT SERPL-MCNC: 7.4 G/DL (ref 6.4–8.2)
PROT UR STRIP-MCNC: NEGATIVE MG/DL
RBC #/AREA URNS AUTO: NORMAL /HPF
SODIUM SERPL-SCNC: 140 MMOL/L (ref 136–145)
SP GR UR STRIP.AUTO: 1.02 (ref 1–1.03)
TRIGL SERPL-MCNC: 89 MG/DL
TSH SERPL DL<=0.05 MIU/L-ACNC: 1.2 UIU/ML (ref 0.36–3.74)
UROBILINOGEN UR QL STRIP.AUTO: 0.2 E.U./DL
WBC #/AREA URNS AUTO: NORMAL /HPF

## 2019-08-01 PROCEDURE — 82570 ASSAY OF URINE CREATININE: CPT | Performed by: FAMILY MEDICINE

## 2019-08-01 PROCEDURE — 84443 ASSAY THYROID STIM HORMONE: CPT

## 2019-08-01 PROCEDURE — 80053 COMPREHEN METABOLIC PANEL: CPT

## 2019-08-01 PROCEDURE — 82043 UR ALBUMIN QUANTITATIVE: CPT | Performed by: FAMILY MEDICINE

## 2019-08-01 PROCEDURE — 36415 COLL VENOUS BLD VENIPUNCTURE: CPT

## 2019-08-01 PROCEDURE — 80061 LIPID PANEL: CPT

## 2019-08-01 PROCEDURE — 81001 URINALYSIS AUTO W/SCOPE: CPT | Performed by: FAMILY MEDICINE

## 2019-09-09 DIAGNOSIS — F41.9 ANXIETY AND DEPRESSION: ICD-10-CM

## 2019-09-09 DIAGNOSIS — F32.A ANXIETY AND DEPRESSION: ICD-10-CM

## 2019-09-09 RX ORDER — SERTRALINE HYDROCHLORIDE 100 MG/1
TABLET, FILM COATED ORAL
Qty: 30 TABLET | Refills: 3 | Status: SHIPPED | OUTPATIENT
Start: 2019-09-09 | End: 2020-03-19

## 2019-11-05 ENCOUNTER — TELEPHONE (OUTPATIENT)
Dept: FAMILY MEDICINE CLINIC | Facility: CLINIC | Age: 44
End: 2019-11-05

## 2019-11-05 DIAGNOSIS — K21.9 GASTROESOPHAGEAL REFLUX DISEASE WITHOUT ESOPHAGITIS: Primary | ICD-10-CM

## 2019-11-05 RX ORDER — FAMOTIDINE 20 MG/1
20 TABLET, FILM COATED ORAL 2 TIMES DAILY
Qty: 30 TABLET | Refills: 3 | Status: SHIPPED | OUTPATIENT
Start: 2019-11-05 | End: 2020-01-20

## 2019-11-12 ENCOUNTER — OFFICE VISIT (OUTPATIENT)
Dept: FAMILY MEDICINE CLINIC | Facility: CLINIC | Age: 44
End: 2019-11-12
Payer: COMMERCIAL

## 2019-11-12 VITALS
HEART RATE: 102 BPM | OXYGEN SATURATION: 97 % | RESPIRATION RATE: 16 BRPM | BODY MASS INDEX: 25.73 KG/M2 | WEIGHT: 190 LBS | HEIGHT: 72 IN | SYSTOLIC BLOOD PRESSURE: 128 MMHG | TEMPERATURE: 98.2 F | DIASTOLIC BLOOD PRESSURE: 76 MMHG

## 2019-11-12 DIAGNOSIS — J06.9 UPPER RESPIRATORY TRACT INFECTION, UNSPECIFIED TYPE: ICD-10-CM

## 2019-11-12 DIAGNOSIS — R68.89 FLU-LIKE SYMPTOMS: Primary | ICD-10-CM

## 2019-11-12 PROCEDURE — 1036F TOBACCO NON-USER: CPT | Performed by: FAMILY MEDICINE

## 2019-11-12 PROCEDURE — 99214 OFFICE O/P EST MOD 30 MIN: CPT | Performed by: FAMILY MEDICINE

## 2019-11-12 PROCEDURE — 87631 RESP VIRUS 3-5 TARGETS: CPT | Performed by: FAMILY MEDICINE

## 2019-11-12 RX ORDER — AMOXICILLIN 500 MG/1
500 CAPSULE ORAL EVERY 8 HOURS SCHEDULED
Qty: 30 CAPSULE | Refills: 0 | Status: SHIPPED | OUTPATIENT
Start: 2019-11-12 | End: 2019-11-22

## 2019-11-12 NOTE — PROGRESS NOTES
Depression Screening and Follow-up Plan: Clincally patient does not have depression  No treatment is required  Assessment/Plan:     Chronic Problems:  No problem-specific Assessment & Plan notes found for this encounter  Visit Diagnosis:  Diagnoses and all orders for this visit:    Flu-like symptoms  -     Influenza A/B and RSV PCR; Future  -     Influenza A/B and RSV PCR    Upper respiratory tract infection, unspecified type  -     amoxicillin (AMOXIL) 500 mg capsule; Take 1 capsule (500 mg total) by mouth every 8 (eight) hours for 10 days     URI  Swab to obtain for influenza  Tessalon pearles tid prn cough, refused  Cheratussin at [facility] for cough, will cause sedation, refused  Increase oral hydration, warm tea with honey, increase nutrition   Adequate rest  Tylenol or Motrin for pain and/or fever  Nasal mist  Humidify room  Use decongestant- Mucinex  Zinc lozenges   Good handwashing      Subjective:    Patient ID: Dinora Hsieh is a 53 y o  male  Ill   Started on Sunday with fever, chills , aches and pains all of which came on suddenly  Cough nonproductive  Negative shortness of breath difficulty breathing, negative exercise intolerance negative swelling to extremities negative nausea vomiting, negative rash  Neg flu vac this yr   -ill contact  Relief with Tylenol Motrin for fever  Supplements none  Nonsmoker           The following portions of the patient's history were reviewed and updated as appropriate: allergies, current medications, past family history, past medical history, past social history, past surgical history and problem list     Review of Systems   Constitutional: Positive for fatigue and fever  Negative for appetite change, chills and unexpected weight change  HENT: Positive for sinus pressure  Negative for congestion, dental problem, ear pain, hearing loss, postnasal drip, rhinorrhea, sinus pain, sneezing, sore throat, tinnitus and voice change      Eyes: Negative for visual disturbance  Respiratory: Positive for cough  Negative for apnea, chest tightness and shortness of breath  Cardiovascular: Negative for chest pain, palpitations and leg swelling  Gastrointestinal: Negative for abdominal pain, blood in stool, constipation, diarrhea, nausea and vomiting  Endocrine: Negative for cold intolerance, heat intolerance, polydipsia, polyphagia and polyuria  Genitourinary: Negative for decreased urine volume, difficulty urinating, dysuria, frequency and hematuria  Musculoskeletal: Positive for myalgias  Negative for arthralgias, back pain, gait problem and joint swelling  Skin: Negative for color change, rash and wound  Allergic/Immunologic: Negative for environmental allergies and food allergies  Neurological: Negative for dizziness, syncope, weakness, light-headedness, numbness and headaches  Hematological: Negative for adenopathy  Does not bruise/bleed easily  Psychiatric/Behavioral: Negative for sleep disturbance and suicidal ideas  The patient is not nervous/anxious            /76   Pulse 102   Temp 98 2 °F (36 8 °C)   Resp 16   Ht 6' (1 829 m)   Wt 86 2 kg (190 lb)   SpO2 97%   BMI 25 77 kg/m²   Social History     Socioeconomic History    Marital status: /Civil Union     Spouse name: Not on file    Number of children: Not on file    Years of education: Not on file    Highest education level: Not on file   Occupational History    Not on file   Social Needs    Financial resource strain: Not on file    Food insecurity:     Worry: Not on file     Inability: Not on file    Transportation needs:     Medical: Not on file     Non-medical: Not on file   Tobacco Use    Smoking status: Never Smoker    Smokeless tobacco: Never Used   Substance and Sexual Activity    Alcohol use: Yes     Comment: Socially    Drug use: No    Sexual activity: Not on file   Lifestyle    Physical activity:     Days per week: Not on file     Minutes per session: Not on file    Stress: Not on file   Relationships    Social connections:     Talks on phone: Not on file     Gets together: Not on file     Attends [employer] service: Not on file     Active member of club or organization: Not on file     Attends meetings of clubs or organizations: Not on file     Relationship status: Not on file    Intimate partner violence:     Fear of current or ex partner: Not on file     Emotionally abused: Not on file     Physically abused: Not on file     Forced sexual activity: Not on file   Other Topics Concern    Not on file   Social History Narrative    Always wear seat belt    Daily caffeine consumption     Past Medical History:   Diagnosis Date    Benign neoplasm of large intestine     Black tarry stools     Depression     Disease of thyroid gland      Family History   Problem Relation Age of Onset    Coronary artery disease Father     Heart attack Father     Hypertension Father     No Known Problems Mother      Past Surgical History:   Procedure Laterality Date    THYROIDECTOMY, PARTIAL         Current Outpatient Medications:     famotidine (PEPCID) 20 mg tablet, Take 1 tablet (20 mg total) by mouth 2 (two) times a day, Disp: 30 tablet, Rfl: 3    amoxicillin (AMOXIL) 500 mg capsule, Take 1 capsule (500 mg total) by mouth every 8 (eight) hours for 10 days, Disp: 30 capsule, Rfl: 0    ranitidine (ZANTAC) 150 MG capsule, Take 1 capsule (150 mg total) by mouth daily as needed for indigestion or heartburn for up to 30 days, Disp: 30 capsule, Rfl: 3    sertraline (ZOLOFT) 100 mg tablet, TAKE 1 TABLET BY MOUTH EVERY DAY (Patient not taking: Reported on 11/12/2019), Disp: 30 tablet, Rfl: 3    valACYclovir (VALTREX) 1,000 mg tablet, Take 1 tablet (1,000 mg total) by mouth 3 (three) times a day for 7 days, Disp: 21 tablet, Rfl: 0    Allergies   Allergen Reactions    Ciprofloxacin Palpitations          Lab Review URI  Transcribe Orders on 08/01/2019   Component Date Value    Sodium 08/01/2019 140     Potassium 08/01/2019 3 7     Chloride 08/01/2019 107     CO2 08/01/2019 26     ANION GAP 08/01/2019 7     BUN 08/01/2019 13     Creatinine 08/01/2019 0 96     Glucose, Fasting 08/01/2019 85     Calcium 08/01/2019 9 0     AST 08/01/2019 11     ALT 08/01/2019 17     Alkaline Phosphatase 08/01/2019 62     Total Protein 08/01/2019 7 4     Albumin 08/01/2019 3 8     Total Bilirubin 08/01/2019 0 57     eGFR 08/01/2019 95     Cholesterol 08/01/2019 167     Triglycerides 08/01/2019 89     HDL, Direct 08/01/2019 43     LDL Calculated 08/01/2019 106*    Non-HDL-Chol (CHOL-HDL) 08/01/2019 124     TSH 3RD GENERATON 08/01/2019 1 200    Office Visit on 06/03/2019   Component Date Value    Creatinine, Ur 08/01/2019 177 0     Microalbum  ,U,Random 08/01/2019 6 4     Microalb Creat Ratio 08/01/2019 4     Clarity, UA 08/01/2019 Clear     Color, UA 08/01/2019 Yellow     Specific Gravity, UA 08/01/2019 1 021     pH, UA 08/01/2019 7 0     Glucose, UA 08/01/2019 Negative     Ketones, UA 08/01/2019 Negative     Blood, UA 08/01/2019 Negative     Protein, UA 08/01/2019 Negative     Nitrite, UA 08/01/2019 Negative     Bilirubin, UA 08/01/2019 Negative     Urobilinogen, UA 08/01/2019 0 2     Leukocytes, UA 08/01/2019 Negative     WBC, UA 08/01/2019 None Seen     RBC, UA 08/01/2019 None Seen     Hyaline Casts, UA 08/01/2019 None Seen     Bacteria, UA 08/01/2019 None Seen     Epithelial Cells 08/01/2019 None Seen         Imaging: No results found  Objective:     Physical Exam   Constitutional: He is oriented to person, place, and time  He appears well-developed and well-nourished  No distress  Ill appearing  Nontoxic   HENT:   Head: Normocephalic and atraumatic  Right Ear: External ear normal    Left Ear: External ear normal    Nose: Nose normal    Mouth/Throat: Oropharynx is clear and moist  No oropharyngeal exudate  Eyes: Conjunctivae are normal  No scleral icterus  Neck: Normal range of motion  Neck supple  Cardiovascular: Normal rate, regular rhythm and normal heart sounds  No murmur heard  Pulmonary/Chest: Effort normal and breath sounds normal  He has no wheezes  Abdominal: Soft  Bowel sounds are normal    Musculoskeletal: Normal range of motion  He exhibits no edema or tenderness  Lymphadenopathy:     He has no cervical adenopathy  Neurological: He is alert and oriented to person, place, and time  He has normal reflexes  Skin: Skin is warm and dry  Psychiatric: He has a normal mood and affect  His behavior is normal  Judgment and thought content normal          There are no Patient Instructions on file for this visit  GAIL Donohue    Portions of the record may have been created with voice recognition software  Occasional wrong word or "sound a like" substitutions may have occurred due to the inherent limitations of voice recognition software  Read the chart carefully and recognize, using context, where substitutions have occurred

## 2019-11-13 ENCOUNTER — TELEPHONE (OUTPATIENT)
Dept: FAMILY MEDICINE CLINIC | Facility: CLINIC | Age: 44
End: 2019-11-13

## 2019-11-13 LAB
FLUAV RNA NPH QL NAA+PROBE: NORMAL
FLUBV RNA NPH QL NAA+PROBE: NORMAL
RSV RNA NPH QL NAA+PROBE: NORMAL

## 2019-11-13 NOTE — TELEPHONE ENCOUNTER
----- Message from Juan C Pollack, [address] Ethan Varela sent at 11/13/2019  9:58 AM EST -----  Informed patient results not influenza continue current

## 2020-01-19 DIAGNOSIS — K21.9 GASTROESOPHAGEAL REFLUX DISEASE WITHOUT ESOPHAGITIS: ICD-10-CM

## 2020-01-20 RX ORDER — FAMOTIDINE 20 MG/1
TABLET, FILM COATED ORAL
Qty: 60 TABLET | Refills: 0 | Status: SHIPPED | OUTPATIENT
Start: 2020-01-20 | End: 2020-02-17

## 2020-02-15 DIAGNOSIS — K21.9 GASTROESOPHAGEAL REFLUX DISEASE WITHOUT ESOPHAGITIS: ICD-10-CM

## 2020-02-17 RX ORDER — FAMOTIDINE 20 MG/1
TABLET, FILM COATED ORAL
Qty: 60 TABLET | Refills: 0 | Status: SHIPPED | OUTPATIENT
Start: 2020-02-17 | End: 2020-03-18

## 2020-03-18 DIAGNOSIS — K21.9 GASTROESOPHAGEAL REFLUX DISEASE WITHOUT ESOPHAGITIS: ICD-10-CM

## 2020-03-18 RX ORDER — FAMOTIDINE 20 MG/1
TABLET, FILM COATED ORAL
Qty: 60 TABLET | Refills: 0 | Status: SHIPPED | OUTPATIENT
Start: 2020-03-18 | End: 2020-04-13

## 2020-03-19 DIAGNOSIS — F41.9 ANXIETY AND DEPRESSION: ICD-10-CM

## 2020-03-19 DIAGNOSIS — F32.A ANXIETY AND DEPRESSION: ICD-10-CM

## 2020-03-19 RX ORDER — SERTRALINE HYDROCHLORIDE 100 MG/1
TABLET, FILM COATED ORAL
Qty: 30 TABLET | Refills: 3 | Status: SHIPPED | OUTPATIENT
Start: 2020-03-19 | End: 2020-07-13

## 2020-03-19 NOTE — TELEPHONE ENCOUNTER
Requested medication(s) are due for refill today: Yes  Patient has already received a courtesy refill: No  Other reason request has been forwarded to provider:Requirement not met

## 2020-04-12 DIAGNOSIS — K21.9 GASTROESOPHAGEAL REFLUX DISEASE WITHOUT ESOPHAGITIS: ICD-10-CM

## 2020-04-13 RX ORDER — FAMOTIDINE 20 MG/1
TABLET, FILM COATED ORAL
Qty: 60 TABLET | Refills: 0 | Status: SHIPPED | OUTPATIENT
Start: 2020-04-13 | End: 2020-09-01 | Stop reason: SDUPTHER

## 2020-06-19 ENCOUNTER — TELEPHONE (OUTPATIENT)
Dept: INTERNAL MEDICINE CLINIC | Facility: CLINIC | Age: 45
End: 2020-06-19

## 2020-06-19 RX ORDER — LOPERAMIDE HYDROCHLORIDE 2 MG/1
2 CAPSULE ORAL
COMMUNITY

## 2020-06-22 ENCOUNTER — OFFICE VISIT (OUTPATIENT)
Dept: INTERNAL MEDICINE CLINIC | Facility: CLINIC | Age: 45
End: 2020-06-22
Payer: COMMERCIAL

## 2020-06-22 VITALS
BODY MASS INDEX: 25.73 KG/M2 | HEIGHT: 72 IN | SYSTOLIC BLOOD PRESSURE: 116 MMHG | WEIGHT: 190 LBS | DIASTOLIC BLOOD PRESSURE: 70 MMHG | HEART RATE: 80 BPM | TEMPERATURE: 98.3 F | OXYGEN SATURATION: 96 %

## 2020-06-22 DIAGNOSIS — E78.2 MIXED HYPERLIPIDEMIA: Primary | ICD-10-CM

## 2020-06-22 DIAGNOSIS — Z20.822 CLOSE EXPOSURE TO COVID-19 VIRUS: ICD-10-CM

## 2020-06-22 DIAGNOSIS — Z11.4 ENCOUNTER FOR SCREENING FOR HIV: ICD-10-CM

## 2020-06-22 DIAGNOSIS — F41.9 ANXIETY AND DEPRESSION: ICD-10-CM

## 2020-06-22 DIAGNOSIS — F32.A ANXIETY AND DEPRESSION: ICD-10-CM

## 2020-06-22 DIAGNOSIS — K58.0 IRRITABLE BOWEL SYNDROME WITH DIARRHEA: ICD-10-CM

## 2020-06-22 PROCEDURE — 99396 PREV VISIT EST AGE 40-64: CPT | Performed by: INTERNAL MEDICINE

## 2020-06-22 PROCEDURE — 3008F BODY MASS INDEX DOCD: CPT | Performed by: INTERNAL MEDICINE

## 2020-06-29 ENCOUNTER — APPOINTMENT (OUTPATIENT)
Dept: LAB | Facility: CLINIC | Age: 45
End: 2020-06-29
Payer: COMMERCIAL

## 2020-06-29 DIAGNOSIS — Z20.822 CLOSE EXPOSURE TO COVID-19 VIRUS: ICD-10-CM

## 2020-06-29 DIAGNOSIS — F41.9 ANXIETY AND DEPRESSION: ICD-10-CM

## 2020-06-29 DIAGNOSIS — D50.9 MICROCYTIC ANEMIA: Primary | ICD-10-CM

## 2020-06-29 DIAGNOSIS — Z11.4 ENCOUNTER FOR SCREENING FOR HIV: ICD-10-CM

## 2020-06-29 DIAGNOSIS — F32.A ANXIETY AND DEPRESSION: ICD-10-CM

## 2020-06-29 DIAGNOSIS — E78.2 MIXED HYPERLIPIDEMIA: ICD-10-CM

## 2020-06-29 LAB
ALBUMIN SERPL BCP-MCNC: 3.7 G/DL (ref 3.5–5)
ALP SERPL-CCNC: 57 U/L (ref 46–116)
ALT SERPL W P-5'-P-CCNC: 18 U/L (ref 12–78)
ANION GAP SERPL CALCULATED.3IONS-SCNC: 5 MMOL/L (ref 4–13)
AST SERPL W P-5'-P-CCNC: 6 U/L (ref 5–45)
BASOPHILS # BLD AUTO: 0.07 THOUSANDS/ΜL (ref 0–0.1)
BASOPHILS NFR BLD AUTO: 2 % (ref 0–1)
BILIRUB SERPL-MCNC: 0.26 MG/DL (ref 0.2–1)
BILIRUB UR QL STRIP: NEGATIVE
BUN SERPL-MCNC: 14 MG/DL (ref 5–25)
CALCIUM SERPL-MCNC: 8.9 MG/DL (ref 8.3–10.1)
CHLORIDE SERPL-SCNC: 109 MMOL/L (ref 100–108)
CHOLEST SERPL-MCNC: 161 MG/DL (ref 50–200)
CLARITY UR: CLEAR
CO2 SERPL-SCNC: 26 MMOL/L (ref 21–32)
COLOR UR: YELLOW
CREAT SERPL-MCNC: 0.93 MG/DL (ref 0.6–1.3)
EOSINOPHIL # BLD AUTO: 0.21 THOUSAND/ΜL (ref 0–0.61)
EOSINOPHIL NFR BLD AUTO: 5 % (ref 0–6)
ERYTHROCYTE [DISTWIDTH] IN BLOOD BY AUTOMATED COUNT: 14.5 % (ref 11.6–15.1)
GFR SERPL CREATININE-BSD FRML MDRD: 98 ML/MIN/1.73SQ M
GLUCOSE P FAST SERPL-MCNC: 91 MG/DL (ref 65–99)
GLUCOSE UR STRIP-MCNC: NEGATIVE MG/DL
HCT VFR BLD AUTO: 37.5 % (ref 36.5–49.3)
HDLC SERPL-MCNC: 45 MG/DL
HGB BLD-MCNC: 10.9 G/DL (ref 12–17)
HGB UR QL STRIP.AUTO: NEGATIVE
IMM GRANULOCYTES # BLD AUTO: 0.01 THOUSAND/UL (ref 0–0.2)
IMM GRANULOCYTES NFR BLD AUTO: 0 % (ref 0–2)
KETONES UR STRIP-MCNC: NEGATIVE MG/DL
LDLC SERPL CALC-MCNC: 107 MG/DL (ref 0–100)
LEUKOCYTE ESTERASE UR QL STRIP: NEGATIVE
LYMPHOCYTES # BLD AUTO: 1.26 THOUSANDS/ΜL (ref 0.6–4.47)
LYMPHOCYTES NFR BLD AUTO: 30 % (ref 14–44)
MCH RBC QN AUTO: 22.7 PG (ref 26.8–34.3)
MCHC RBC AUTO-ENTMCNC: 29.1 G/DL (ref 31.4–37.4)
MCV RBC AUTO: 78 FL (ref 82–98)
MONOCYTES # BLD AUTO: 0.32 THOUSAND/ΜL (ref 0.17–1.22)
MONOCYTES NFR BLD AUTO: 8 % (ref 4–12)
NEUTROPHILS # BLD AUTO: 2.3 THOUSANDS/ΜL (ref 1.85–7.62)
NEUTS SEG NFR BLD AUTO: 55 % (ref 43–75)
NITRITE UR QL STRIP: NEGATIVE
NRBC BLD AUTO-RTO: 0 /100 WBCS
PH UR STRIP.AUTO: 5.5 [PH]
PLATELET # BLD AUTO: 259 THOUSANDS/UL (ref 149–390)
PMV BLD AUTO: 12.3 FL (ref 8.9–12.7)
POTASSIUM SERPL-SCNC: 4.7 MMOL/L (ref 3.5–5.3)
PROT SERPL-MCNC: 7.2 G/DL (ref 6.4–8.2)
PROT UR STRIP-MCNC: NEGATIVE MG/DL
RBC # BLD AUTO: 4.8 MILLION/UL (ref 3.88–5.62)
SARS-COV-2 IGG+IGM SERPL QL IA: NORMAL
SODIUM SERPL-SCNC: 140 MMOL/L (ref 136–145)
SP GR UR STRIP.AUTO: 1.02 (ref 1–1.03)
TRIGL SERPL-MCNC: 47 MG/DL
TSH SERPL DL<=0.05 MIU/L-ACNC: 1.96 UIU/ML (ref 0.36–3.74)
UROBILINOGEN UR QL STRIP.AUTO: 0.2 E.U./DL
WBC # BLD AUTO: 4.17 THOUSAND/UL (ref 4.31–10.16)

## 2020-06-29 PROCEDURE — 87389 HIV-1 AG W/HIV-1&-2 AB AG IA: CPT

## 2020-06-29 PROCEDURE — 36415 COLL VENOUS BLD VENIPUNCTURE: CPT

## 2020-06-29 PROCEDURE — 84443 ASSAY THYROID STIM HORMONE: CPT

## 2020-06-29 PROCEDURE — 85025 COMPLETE CBC W/AUTO DIFF WBC: CPT

## 2020-06-29 PROCEDURE — 80061 LIPID PANEL: CPT

## 2020-06-29 PROCEDURE — 86769 SARS-COV-2 COVID-19 ANTIBODY: CPT

## 2020-06-29 PROCEDURE — 80053 COMPREHEN METABOLIC PANEL: CPT

## 2020-06-29 PROCEDURE — 81003 URINALYSIS AUTO W/O SCOPE: CPT | Performed by: INTERNAL MEDICINE

## 2020-06-30 ENCOUNTER — TELEPHONE (OUTPATIENT)
Dept: INTERNAL MEDICINE CLINIC | Facility: CLINIC | Age: 45
End: 2020-06-30

## 2020-06-30 LAB — HIV 1+2 AB+HIV1 P24 AG SERPL QL IA: NORMAL

## 2020-06-30 NOTE — TELEPHONE ENCOUNTER
Wife said  was talking to Dr Willa Camargo yesterday & he wants to know who was doing his colonoscopy    She said it's Dr Desire Jimenez @ [address]    And if Dr Zakiya Garcia has any other questions    To please call her so she can answer them

## 2020-07-01 ENCOUNTER — APPOINTMENT (OUTPATIENT)
Dept: LAB | Facility: CLINIC | Age: 45
End: 2020-07-01
Payer: COMMERCIAL

## 2020-07-01 DIAGNOSIS — D50.9 MICROCYTIC ANEMIA: ICD-10-CM

## 2020-07-01 LAB
FOLATE SERPL-MCNC: >20 NG/ML (ref 3.1–17.5)
IRON SATN MFR SERPL: 5 %
IRON SERPL-MCNC: 21 UG/DL (ref 65–175)
TIBC SERPL-MCNC: 415 UG/DL (ref 250–450)
TRANSFERRIN SERPL-MCNC: 314 MG/DL (ref 200–400)

## 2020-07-01 PROCEDURE — 36415 COLL VENOUS BLD VENIPUNCTURE: CPT

## 2020-07-01 PROCEDURE — 83550 IRON BINDING TEST: CPT

## 2020-07-01 PROCEDURE — 82746 ASSAY OF FOLIC ACID SERUM: CPT

## 2020-07-01 PROCEDURE — 84466 ASSAY OF TRANSFERRIN: CPT

## 2020-07-01 PROCEDURE — 83540 ASSAY OF IRON: CPT

## 2020-07-02 ENCOUNTER — TELEPHONE (OUTPATIENT)
Dept: INTERNAL MEDICINE CLINIC | Facility: CLINIC | Age: 45
End: 2020-07-02

## 2020-07-02 NOTE — TELEPHONE ENCOUNTER
[phone]; Lana's cell- his wife    He gave blood a few wks ago  Would this put an impact on his labs that were just done? Please give Duane a call

## 2020-07-13 DIAGNOSIS — F41.9 ANXIETY AND DEPRESSION: ICD-10-CM

## 2020-07-13 DIAGNOSIS — F32.A ANXIETY AND DEPRESSION: ICD-10-CM

## 2020-07-13 RX ORDER — SERTRALINE HYDROCHLORIDE 100 MG/1
TABLET, FILM COATED ORAL
Qty: 30 TABLET | Refills: 3 | Status: SHIPPED | OUTPATIENT
Start: 2020-07-13 | End: 2021-01-11

## 2020-09-01 DIAGNOSIS — K21.9 GASTROESOPHAGEAL REFLUX DISEASE WITHOUT ESOPHAGITIS: ICD-10-CM

## 2020-09-01 RX ORDER — FAMOTIDINE 20 MG/1
20 TABLET, FILM COATED ORAL 2 TIMES DAILY
Qty: 60 TABLET | Refills: 0 | Status: SHIPPED | OUTPATIENT
Start: 2020-09-01 | End: 2021-10-27

## 2020-09-28 ENCOUNTER — TELEPHONE (OUTPATIENT)
Dept: INTERNAL MEDICINE CLINIC | Facility: CLINIC | Age: 45
End: 2020-09-28

## 2020-09-28 NOTE — TELEPHONE ENCOUNTER
PATIENTS WIFE IS REQUESTING CALL BACK TO DISCUSS PATIENTS IRON DEFICIENCY    CALL BACK # [phone]

## 2020-12-22 DIAGNOSIS — F41.9 ANXIETY AND DEPRESSION: ICD-10-CM

## 2020-12-22 DIAGNOSIS — F32.A ANXIETY AND DEPRESSION: ICD-10-CM

## 2020-12-22 RX ORDER — SERTRALINE HYDROCHLORIDE 100 MG/1
TABLET, FILM COATED ORAL
Qty: 30 TABLET | Refills: 3 | OUTPATIENT
Start: 2020-12-22

## 2021-01-07 DIAGNOSIS — F41.9 ANXIETY AND DEPRESSION: ICD-10-CM

## 2021-01-07 DIAGNOSIS — F32.A ANXIETY AND DEPRESSION: ICD-10-CM

## 2021-01-11 RX ORDER — SERTRALINE HYDROCHLORIDE 100 MG/1
TABLET, FILM COATED ORAL
Qty: 90 TABLET | Refills: 3 | Status: SHIPPED | OUTPATIENT
Start: 2021-01-11 | End: 2022-01-12

## 2021-03-03 ENCOUNTER — TELEPHONE (OUTPATIENT)
Dept: INTERNAL MEDICINE CLINIC | Facility: CLINIC | Age: 46
End: 2021-03-03

## 2021-03-04 ENCOUNTER — TRANSCRIBE ORDERS (OUTPATIENT)
Dept: LAB | Facility: CLINIC | Age: 46
End: 2021-03-04

## 2021-03-04 ENCOUNTER — LAB (OUTPATIENT)
Dept: LAB | Facility: CLINIC | Age: 46
End: 2021-03-04
Payer: COMMERCIAL

## 2021-03-04 DIAGNOSIS — D50.8 OTHER IRON DEFICIENCY ANEMIA: ICD-10-CM

## 2021-03-04 DIAGNOSIS — D50.8 OTHER IRON DEFICIENCY ANEMIA: Primary | ICD-10-CM

## 2021-03-04 LAB
BASOPHILS # BLD AUTO: 0.03 THOUSANDS/ΜL (ref 0–0.1)
BASOPHILS NFR BLD AUTO: 1 % (ref 0–1)
EOSINOPHIL # BLD AUTO: 0.2 THOUSAND/ΜL (ref 0–0.61)
EOSINOPHIL NFR BLD AUTO: 4 % (ref 0–6)
ERYTHROCYTE [DISTWIDTH] IN BLOOD BY AUTOMATED COUNT: 16.1 % (ref 11.6–15.1)
FERRITIN SERPL-MCNC: 29 NG/ML (ref 8–388)
HCT VFR BLD AUTO: 47.3 % (ref 36.5–49.3)
HGB BLD-MCNC: 15.3 G/DL (ref 12–17)
IMM GRANULOCYTES # BLD AUTO: 0.01 THOUSAND/UL (ref 0–0.2)
IMM GRANULOCYTES NFR BLD AUTO: 0 % (ref 0–2)
IRON SATN MFR SERPL: 37 %
IRON SERPL-MCNC: 115 UG/DL (ref 65–175)
LYMPHOCYTES # BLD AUTO: 1.78 THOUSANDS/ΜL (ref 0.6–4.47)
LYMPHOCYTES NFR BLD AUTO: 33 % (ref 14–44)
MCH RBC QN AUTO: 27.3 PG (ref 26.8–34.3)
MCHC RBC AUTO-ENTMCNC: 32.3 G/DL (ref 31.4–37.4)
MCV RBC AUTO: 84 FL (ref 82–98)
MONOCYTES # BLD AUTO: 0.34 THOUSAND/ΜL (ref 0.17–1.22)
MONOCYTES NFR BLD AUTO: 6 % (ref 4–12)
NEUTROPHILS # BLD AUTO: 3.01 THOUSANDS/ΜL (ref 1.85–7.62)
NEUTS SEG NFR BLD AUTO: 56 % (ref 43–75)
NRBC BLD AUTO-RTO: 0 /100 WBCS
PLATELET # BLD AUTO: 180 THOUSANDS/UL (ref 149–390)
PMV BLD AUTO: 11.5 FL (ref 8.9–12.7)
RBC # BLD AUTO: 5.61 MILLION/UL (ref 3.88–5.62)
RETICS # AUTO: NORMAL 10*3/UL (ref 14356–105094)
RETICS # CALC: 0.81 % (ref 0.37–1.87)
TIBC SERPL-MCNC: 315 UG/DL (ref 250–450)
WBC # BLD AUTO: 5.37 THOUSAND/UL (ref 4.31–10.16)

## 2021-03-04 PROCEDURE — 83550 IRON BINDING TEST: CPT

## 2021-03-04 PROCEDURE — 83540 ASSAY OF IRON: CPT

## 2021-03-04 PROCEDURE — 85025 COMPLETE CBC W/AUTO DIFF WBC: CPT

## 2021-03-04 PROCEDURE — 85045 AUTOMATED RETICULOCYTE COUNT: CPT

## 2021-03-04 PROCEDURE — 36415 COLL VENOUS BLD VENIPUNCTURE: CPT

## 2021-03-04 PROCEDURE — 82728 ASSAY OF FERRITIN: CPT

## 2021-08-07 ENCOUNTER — OFFICE VISIT (OUTPATIENT)
Dept: INTERNAL MEDICINE CLINIC | Facility: CLINIC | Age: 46
End: 2021-08-07
Payer: COMMERCIAL

## 2021-08-07 VITALS
DIASTOLIC BLOOD PRESSURE: 70 MMHG | OXYGEN SATURATION: 99 % | HEIGHT: 73 IN | SYSTOLIC BLOOD PRESSURE: 108 MMHG | WEIGHT: 191.8 LBS | HEART RATE: 85 BPM | BODY MASS INDEX: 25.42 KG/M2

## 2021-08-07 DIAGNOSIS — Z12.5 PROSTATE CANCER SCREENING: ICD-10-CM

## 2021-08-07 DIAGNOSIS — R41.89 BRAIN FOG: Primary | ICD-10-CM

## 2021-08-07 DIAGNOSIS — Z11.59 ENCOUNTER FOR HEPATITIS C SCREENING TEST FOR LOW RISK PATIENT: ICD-10-CM

## 2021-08-07 PROCEDURE — 1036F TOBACCO NON-USER: CPT | Performed by: INTERNAL MEDICINE

## 2021-08-07 PROCEDURE — 99214 OFFICE O/P EST MOD 30 MIN: CPT | Performed by: INTERNAL MEDICINE

## 2021-08-07 PROCEDURE — 3725F SCREEN DEPRESSION PERFORMED: CPT | Performed by: INTERNAL MEDICINE

## 2021-08-07 PROCEDURE — 3008F BODY MASS INDEX DOCD: CPT | Performed by: INTERNAL MEDICINE

## 2021-08-07 RX ORDER — FERROUS SULFATE 325(65) MG
TABLET ORAL
COMMUNITY
Start: 2021-07-15 | End: 2021-10-27

## 2021-08-07 NOTE — PATIENT INSTRUCTIONS
A patient with the above complaint of "brain fog "  Laboratory panels to be done but please do in the afternoon around 3:00 p m  after a fairly quiet day  Get a glucometer and check your blood sugar before and after your lunch  Further recommendations will depend upon results of the studies       use over-the-counter Debrox in the ears and then call here to get an ear flush next week

## 2021-08-07 NOTE — PROGRESS NOTES
Assessment/Plan:       Diagnoses and all orders for this visit:    Brain fog  -     CBC and differential; Future  -     Comprehensive metabolic panel; Future  -     Hemoglobin A1C; Future  -     UA (URINE) with reflex to Scope  -     T3, free; Future  -     T4, free; Future  -     TSH, 3rd generation; Future  -     Thyroid Antibodies Panel; Future  -     Cortisol Level, PM Specimen; Future  -     Testosterone; Future    Encounter for hepatitis C screening test for low risk patient  -     Hepatitis C antibody; Future    Prostate cancer screening  -     PSA, Total Screen; Future    Other orders  -     ferrous sulfate 325 (65 Fe) mg tablet; PLEASE SEE ATTACHED FOR DETAILED DIRECTIONS                Subjective:      Patient ID: Baldev Mujica is a 50 y o  male  "Brain fog "  This 25-year-old who feels generally well reports that for at least several months, in the afternoons between to improve p m , he feels kind of decrease in his ability to concentrate and think clearly which then resolves  It is not severe but it is noticeable  There are no physical complaints associated with this at all  It does not interfere with his life it is just something he notes to himself  He is not making any mistakes at work  His family have not commented to him about anything like this  He had a partial thyroidectomy in his youth  No history of ongoing illness otherwise  The only other issue he has is a sensation of blockage right ear any has cerumen impaction      The following portions of the patient's history were reviewed and updated as appropriate:   He has a past medical history of Benign neoplasm of large intestine and Disease of thyroid gland  ,  does not have any pertinent problems on file  ,   has a past surgical history that includes Thyroidectomy, partial ,  family history includes Coronary artery disease in his father; Heart attack in his father; Hypertension in his father; No Known Problems in his mother  ,   reports that he has never smoked  He has never used smokeless tobacco  He reports current alcohol use  He reports that he does not use drugs  ,  is allergic to ciprofloxacin     Current Outpatient Medications   Medication Sig Dispense Refill    famotidine (PEPCID) 20 mg tablet Take 1 tablet (20 mg total) by mouth 2 (two) times a day 60 tablet 0    ferrous sulfate 325 (65 Fe) mg tablet PLEASE SEE ATTACHED FOR DETAILED DIRECTIONS      loperamide (IMODIUM) 2 mg capsule Take 2 mg by mouth      sertraline (ZOLOFT) 100 mg tablet TAKE 1 TABLET BY MOUTH EVERY DAY 90 tablet 3     No current facility-administered medications for this visit  Review of Systems   HENT: Positive for hearing loss  Psychiatric/Behavioral: Positive for decreased concentration  All other systems reviewed and are negative  Objective:  Vitals:    08/07/21 0859   BP: 108/70   Pulse: 85   SpO2: 99%      Physical Exam  Constitutional:       Appearance: He is well-developed  HENT:      Head: Normocephalic and atraumatic  Right Ear: There is impacted cerumen  Eyes:      Conjunctiva/sclera: Conjunctivae normal       Pupils: Pupils are equal, round, and reactive to light  Neck:      Thyroid: No thyromegaly  Cardiovascular:      Rate and Rhythm: Normal rate and regular rhythm  Heart sounds: Normal heart sounds  No murmur heard  Pulmonary:      Effort: Pulmonary effort is normal  No respiratory distress  Breath sounds: No wheezing or rales  Abdominal:      Palpations: Abdomen is soft  Genitourinary:     Penis: Normal        Testes:         Right: Mass or tenderness not present  Left: Mass or tenderness not present  Musculoskeletal:         General: No deformity  Normal range of motion  Cervical back: Normal range of motion  Lymphadenopathy:      Cervical: No cervical adenopathy  Skin:     General: Skin is warm and dry     Neurological:      Mental Status: He is alert and oriented to person, place, and time  Psychiatric:         Behavior: Behavior normal          Thought Content: Thought content normal          Judgment: Judgment normal            Patient Instructions    A patient with the above complaint of "brain fog "  Laboratory panels to be done but please do in the afternoon around 3:00 p m  after a fairly quiet day  Get a glucometer and check your blood sugar before and after your lunch  Further recommendations will depend upon results of the studies       use over-the-counter Debrox in the ears and then call here to get an ear flush next week

## 2021-08-19 ENCOUNTER — APPOINTMENT (OUTPATIENT)
Dept: LAB | Facility: CLINIC | Age: 46
End: 2021-08-19
Payer: COMMERCIAL

## 2021-08-19 DIAGNOSIS — Z11.59 ENCOUNTER FOR HEPATITIS C SCREENING TEST FOR LOW RISK PATIENT: ICD-10-CM

## 2021-08-19 DIAGNOSIS — Z12.5 PROSTATE CANCER SCREENING: ICD-10-CM

## 2021-08-19 DIAGNOSIS — R41.89 BRAIN FOG: ICD-10-CM

## 2021-08-19 PROCEDURE — 36415 COLL VENOUS BLD VENIPUNCTURE: CPT

## 2021-08-19 PROCEDURE — 86800 THYROGLOBULIN ANTIBODY: CPT

## 2021-08-19 PROCEDURE — 80053 COMPREHEN METABOLIC PANEL: CPT

## 2021-08-19 PROCEDURE — 86376 MICROSOMAL ANTIBODY EACH: CPT

## 2021-08-19 PROCEDURE — 84481 FREE ASSAY (FT-3): CPT

## 2021-08-19 PROCEDURE — 85025 COMPLETE CBC W/AUTO DIFF WBC: CPT

## 2021-08-19 PROCEDURE — 84443 ASSAY THYROID STIM HORMONE: CPT

## 2021-08-19 PROCEDURE — 81003 URINALYSIS AUTO W/O SCOPE: CPT | Performed by: INTERNAL MEDICINE

## 2021-08-19 PROCEDURE — 83036 HEMOGLOBIN GLYCOSYLATED A1C: CPT

## 2021-08-19 PROCEDURE — 84403 ASSAY OF TOTAL TESTOSTERONE: CPT

## 2021-08-19 PROCEDURE — 86803 HEPATITIS C AB TEST: CPT

## 2021-08-19 PROCEDURE — G0103 PSA SCREENING: HCPCS

## 2021-08-19 PROCEDURE — 84439 ASSAY OF FREE THYROXINE: CPT

## 2021-08-19 PROCEDURE — 82533 TOTAL CORTISOL: CPT

## 2021-08-20 LAB
ALBUMIN SERPL BCP-MCNC: 3.8 G/DL (ref 3.5–5)
ALP SERPL-CCNC: 58 U/L (ref 46–116)
ALT SERPL W P-5'-P-CCNC: 23 U/L (ref 12–78)
ANION GAP SERPL CALCULATED.3IONS-SCNC: 2 MMOL/L (ref 4–13)
AST SERPL W P-5'-P-CCNC: 14 U/L (ref 5–45)
BASOPHILS # BLD AUTO: 0.05 THOUSANDS/ΜL (ref 0–0.1)
BASOPHILS NFR BLD AUTO: 1 % (ref 0–1)
BILIRUB SERPL-MCNC: 0.3 MG/DL (ref 0.2–1)
BILIRUB UR QL STRIP: NEGATIVE
BUN SERPL-MCNC: 15 MG/DL (ref 5–25)
CALCIUM SERPL-MCNC: 9 MG/DL (ref 8.3–10.1)
CHLORIDE SERPL-SCNC: 108 MMOL/L (ref 100–108)
CLARITY UR: CLEAR
CO2 SERPL-SCNC: 29 MMOL/L (ref 21–32)
COLOR UR: YELLOW
CORTIS PM SERPL-MCNC: 5.5 UG/DL (ref 3.1–16.7)
CREAT SERPL-MCNC: 0.86 MG/DL (ref 0.6–1.3)
EOSINOPHIL # BLD AUTO: 0.21 THOUSAND/ΜL (ref 0–0.61)
EOSINOPHIL NFR BLD AUTO: 3 % (ref 0–6)
ERYTHROCYTE [DISTWIDTH] IN BLOOD BY AUTOMATED COUNT: 13.5 % (ref 11.6–15.1)
EST. AVERAGE GLUCOSE BLD GHB EST-MCNC: 103 MG/DL
GFR SERPL CREATININE-BSD FRML MDRD: 103 ML/MIN/1.73SQ M
GLUCOSE SERPL-MCNC: 77 MG/DL (ref 65–140)
GLUCOSE UR STRIP-MCNC: NEGATIVE MG/DL
HBA1C MFR BLD: 5.2 %
HCT VFR BLD AUTO: 46.2 % (ref 36.5–49.3)
HCV AB SER QL: NORMAL
HGB BLD-MCNC: 14.6 G/DL (ref 12–17)
HGB UR QL STRIP.AUTO: NEGATIVE
IMM GRANULOCYTES # BLD AUTO: 0.02 THOUSAND/UL (ref 0–0.2)
IMM GRANULOCYTES NFR BLD AUTO: 0 % (ref 0–2)
KETONES UR STRIP-MCNC: NEGATIVE MG/DL
LEUKOCYTE ESTERASE UR QL STRIP: NEGATIVE
LYMPHOCYTES # BLD AUTO: 2.04 THOUSANDS/ΜL (ref 0.6–4.47)
LYMPHOCYTES NFR BLD AUTO: 33 % (ref 14–44)
MCH RBC QN AUTO: 27.7 PG (ref 26.8–34.3)
MCHC RBC AUTO-ENTMCNC: 31.6 G/DL (ref 31.4–37.4)
MCV RBC AUTO: 88 FL (ref 82–98)
MONOCYTES # BLD AUTO: 0.42 THOUSAND/ΜL (ref 0.17–1.22)
MONOCYTES NFR BLD AUTO: 7 % (ref 4–12)
NEUTROPHILS # BLD AUTO: 3.37 THOUSANDS/ΜL (ref 1.85–7.62)
NEUTS SEG NFR BLD AUTO: 56 % (ref 43–75)
NITRITE UR QL STRIP: NEGATIVE
NRBC BLD AUTO-RTO: 0 /100 WBCS
PH UR STRIP.AUTO: 6.5 [PH]
PLATELET # BLD AUTO: 190 THOUSANDS/UL (ref 149–390)
PMV BLD AUTO: 11.5 FL (ref 8.9–12.7)
POTASSIUM SERPL-SCNC: 3.8 MMOL/L (ref 3.5–5.3)
PROT SERPL-MCNC: 7.6 G/DL (ref 6.4–8.2)
PROT UR STRIP-MCNC: NEGATIVE MG/DL
PSA SERPL-MCNC: 1.4 NG/ML (ref 0–4)
RBC # BLD AUTO: 5.28 MILLION/UL (ref 3.88–5.62)
SODIUM SERPL-SCNC: 139 MMOL/L (ref 136–145)
SP GR UR STRIP.AUTO: 1.02 (ref 1–1.03)
T3FREE SERPL-MCNC: 2.95 PG/ML (ref 2.3–4.2)
T4 FREE SERPL-MCNC: 0.94 NG/DL (ref 0.76–1.46)
TESTOST SERPL-MCNC: 342 NG/DL (ref 113–1065)
TSH SERPL DL<=0.05 MIU/L-ACNC: 1.37 UIU/ML (ref 0.36–3.74)
UROBILINOGEN UR QL STRIP.AUTO: 0.2 E.U./DL
WBC # BLD AUTO: 6.11 THOUSAND/UL (ref 4.31–10.16)

## 2021-08-21 LAB
THYROGLOB AB SERPL-ACNC: <1 IU/ML (ref 0–0.9)
THYROPEROXIDASE AB SERPL-ACNC: 9 IU/ML (ref 0–34)

## 2021-10-27 ENCOUNTER — OFFICE VISIT (OUTPATIENT)
Dept: INTERNAL MEDICINE CLINIC | Facility: CLINIC | Age: 46
End: 2021-10-27
Payer: COMMERCIAL

## 2021-10-27 VITALS
OXYGEN SATURATION: 98 % | HEART RATE: 71 BPM | HEIGHT: 73 IN | DIASTOLIC BLOOD PRESSURE: 90 MMHG | TEMPERATURE: 98 F | SYSTOLIC BLOOD PRESSURE: 128 MMHG | WEIGHT: 196.8 LBS | BODY MASS INDEX: 26.08 KG/M2

## 2021-10-27 DIAGNOSIS — Z23 ENCOUNTER FOR IMMUNIZATION: Primary | ICD-10-CM

## 2021-10-27 DIAGNOSIS — M54.2 CERVICALGIA: ICD-10-CM

## 2021-10-27 PROCEDURE — 99213 OFFICE O/P EST LOW 20 MIN: CPT | Performed by: INTERNAL MEDICINE

## 2021-10-27 PROCEDURE — 90471 IMMUNIZATION ADMIN: CPT | Performed by: INTERNAL MEDICINE

## 2021-10-27 PROCEDURE — 1036F TOBACCO NON-USER: CPT | Performed by: INTERNAL MEDICINE

## 2021-10-27 PROCEDURE — 3008F BODY MASS INDEX DOCD: CPT | Performed by: INTERNAL MEDICINE

## 2021-10-27 PROCEDURE — 90686 IIV4 VACC NO PRSV 0.5 ML IM: CPT | Performed by: INTERNAL MEDICINE

## 2021-10-27 RX ORDER — FAMOTIDINE 40 MG/1
TABLET, FILM COATED ORAL
COMMUNITY
Start: 2021-10-15

## 2021-10-27 RX ORDER — FERROUS SULFATE 325(65) MG
325 TABLET ORAL DAILY
COMMUNITY
Start: 2021-08-18

## 2021-10-28 ENCOUNTER — APPOINTMENT (OUTPATIENT)
Dept: RADIOLOGY | Facility: CLINIC | Age: 46
End: 2021-10-28
Payer: COMMERCIAL

## 2021-10-28 DIAGNOSIS — M54.2 CERVICALGIA: ICD-10-CM

## 2021-10-28 PROCEDURE — 72050 X-RAY EXAM NECK SPINE 4/5VWS: CPT

## 2021-12-15 ENCOUNTER — TELEPHONE (OUTPATIENT)
Dept: INTERNAL MEDICINE CLINIC | Facility: CLINIC | Age: 46
End: 2021-12-15

## 2022-01-12 DIAGNOSIS — F32.A ANXIETY AND DEPRESSION: ICD-10-CM

## 2022-01-12 DIAGNOSIS — F41.9 ANXIETY AND DEPRESSION: ICD-10-CM

## 2022-01-12 RX ORDER — SERTRALINE HYDROCHLORIDE 100 MG/1
TABLET, FILM COATED ORAL
Qty: 30 TABLET | Refills: 11 | Status: SHIPPED | OUTPATIENT
Start: 2022-01-12

## 2022-11-29 ENCOUNTER — OFFICE VISIT (OUTPATIENT)
Dept: INTERNAL MEDICINE CLINIC | Facility: CLINIC | Age: 47
End: 2022-11-29

## 2022-11-29 VITALS
RESPIRATION RATE: 14 BRPM | HEIGHT: 73 IN | HEART RATE: 70 BPM | TEMPERATURE: 99.1 F | WEIGHT: 190 LBS | BODY MASS INDEX: 25.18 KG/M2 | OXYGEN SATURATION: 98 % | DIASTOLIC BLOOD PRESSURE: 75 MMHG | SYSTOLIC BLOOD PRESSURE: 118 MMHG

## 2022-11-29 DIAGNOSIS — Z23 ENCOUNTER FOR IMMUNIZATION: Primary | ICD-10-CM

## 2022-11-29 DIAGNOSIS — Z00.00 HEALTHCARE MAINTENANCE: ICD-10-CM

## 2022-11-29 NOTE — PROGRESS NOTES
Chronic component might somebody  Assessment/Plan:       Diagnoses and all orders for this visit:    Encounter for immunization    Healthcare maintenance  -     PSA, Total Screen; Future  -     CBC and differential; Future  -     Lipid Panel with Direct LDL reflex; Future  -     Hemoglobin A1C; Future  -     Comprehensive metabolic panel; Future  -     TSH, 3rd generation with Free T4 reflex; Future  -     UA (URINE) with reflex to Scope                Subjective:      Patient ID: Bebe Macdonald is a 50 y o  male  Healthcare maintenance visit   A 38-year-old feels well  A degree of anxious depression is treated     Asymptomatic     No cigarettes, no illicit drugs minimal alcohol  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  Lives with his wife  Male mid life crisis: Just bought a [employer]     Colonoscopy done in [place] 2 years ago he reports it was normal       Chest had influenza a      The following portions of the patient's history were reviewed and updated as appropriate:   He has a past medical history of Benign neoplasm of large intestine and Disease of thyroid gland  ,  does not have any pertinent problems on file  ,   has a past surgical history that includes Thyroidectomy, partial ,  family history includes Coronary artery disease in his father; Heart attack in his father; Hypertension in his father; No Known Problems in his mother  ,   reports that he has never smoked  He has never used smokeless tobacco  He reports current alcohol use of about 2 0 standard drinks per week  He reports that he does not use drugs  ,  is allergic to ciprofloxacin     Current Outpatient Medications   Medication Sig Dispense Refill   • famotidine (PEPCID) 40 MG tablet      • ferrous sulfate 325 (65 Fe) mg tablet Take 325 mg by mouth daily     • loperamide (IMODIUM) 2 mg capsule Take 2 mg by mouth     • sertraline (ZOLOFT) 100 mg tablet TAKE 1 TABLET BY MOUTH EVERY DAY 30 tablet 11     No current facility-administered medications for this visit  Review of Systems   Constitutional: Negative for chills and fever  HENT: Negative for ear pain and sore throat  Eyes: Negative for pain and visual disturbance  Respiratory: Negative for cough and shortness of breath  Cardiovascular: Negative for chest pain and palpitations  Gastrointestinal: Negative for abdominal pain and vomiting  Genitourinary: Negative for dysuria and hematuria  Musculoskeletal: Negative for arthralgias and back pain  Skin: Negative for color change and rash  Neurological: Negative for seizures and syncope  All other systems reviewed and are negative  Objective:  Vitals:    11/29/22 1539   BP: 118/75   Pulse: 70   Resp: 14   Temp: 99 1 °F (37 3 °C)   SpO2: 98%      Physical Exam  Constitutional:       Appearance: He is well-developed and well-nourished  HENT:      Head: Normocephalic and atraumatic  Eyes:      Pupils: Pupils are equal, round, and reactive to light  Neck:      Thyroid: No thyromegaly  Trachea: No tracheal deviation  Cardiovascular:      Rate and Rhythm: Normal rate and regular rhythm  Heart sounds: Normal heart sounds  No murmur heard  No gallop  Pulmonary:      Effort: Pulmonary effort is normal  No respiratory distress  Breath sounds: No wheezing or rales  Abdominal:      General: Bowel sounds are normal       Palpations: Abdomen is soft  Tenderness: There is no abdominal tenderness  Musculoskeletal:         General: No tenderness or deformity  Normal range of motion  Cervical back: Normal range of motion and neck supple  Skin:     General: Skin is warm and dry  Neurological:      Mental Status: He is alert and oriented to person, place, and time  Coordination: Coordination normal       Deep Tendon Reflexes: Reflexes are normal and symmetric     Psychiatric:         Mood and Affect: Mood and affect normal            Patient Instructions   A well visit with no problems identified; need “routine “laboratory test and yearly follow-up

## 2022-11-30 ENCOUNTER — TELEPHONE (OUTPATIENT)
Dept: ADMINISTRATIVE | Facility: OTHER | Age: 47
End: 2022-11-30

## 2022-11-30 NOTE — LETTER
Procedure Request Form: Colonoscopy      Date Requested: 22  Patient: Betsy Martinezing Tuohy  Patient : 1975   Referring Provider: Lorrie Willis, MD        Date of Procedure ______________________________       The above patient has informed us that they have completed their   most recent Colonoscopy at your facility  Please complete   this form and attach all corresponding procedure reports/results  Comments __________________________________________________________  ____________________________________________________________________  ____________________________________________________________________  ____________________________________________________________________    Facility Completing Procedure _________________________________________    Form Completed By (print name) _______________________________________      Signature __________________________________________________________      These reports are needed for  compliance  Please fax this completed form and a copy of the procedure report to our office located at [place] [address] as soon as possible to [phone] lindsay Quintero: Phone [phone]    We thank you for your assistance in treating our mutual patient

## 2022-11-30 NOTE — TELEPHONE ENCOUNTER
----- Message from Priyank Hampton sent at 11/29/2022  3:23 PM EST -----  Regarding: colonoscopy  11/29/22 3:23 PM    Hello, our patient Shayy Arias has had CRC: Colonoscopy completed/performed  Please assist in updating the patient chart by pulling the Care Everywhere (CE) document  The date of service is 2020       Thank you,  Priyank Howard 62

## 2022-11-30 NOTE — LETTER
Procedure Request Form: Colonoscopy      Date Requested: 22  Patient: Ayde Holiday Tuohy  Patient : 1975   Referring Provider: Wendy Ovens, MD        Date of Procedure ______________________________       The above patient has informed us that they have completed their   most recent Colonoscopy at your facility  Please complete   this form and attach all corresponding procedure reports/results  Comments __________________________________________________________  ____________________________________________________________________  ____________________________________________________________________  ____________________________________________________________________    Facility Completing Procedure _________________________________________    Form Completed By (print name) _______________________________________      Signature __________________________________________________________      These reports are needed for  compliance  Please fax this completed form and a copy of the procedure report to our office located at [place] [address] as soon as possible to [phone] lindsay Morse: Phone [phone]    We thank you for your assistance in treating our mutual patient

## 2022-12-02 NOTE — TELEPHONE ENCOUNTER
Upon review of the In Basket request and the patient's chart, initial outreach has been made via fax to facility  Please see Contacts section for details  Colonoscopy result was not attached in Care Everywhere      Thank you  Gamaliel Rubio

## 2022-12-08 NOTE — TELEPHONE ENCOUNTER
As a follow-up, a second attempt has been made for outreach via fax to facility  Please see Contacts section for details      Thank you  Allen Alonzo

## 2022-12-14 NOTE — TELEPHONE ENCOUNTER
Upon review of the In Basket request we were able to locate, review, and update the patient chart as requested for CRC: Colonoscopy  Any additional questions or concerns should be emailed to the Practice Liaisons via the appropriate education email address, please do not reply via In Basket      Thank you  Chris Shaffer

## 2023-01-04 DIAGNOSIS — F41.9 ANXIETY AND DEPRESSION: ICD-10-CM

## 2023-01-04 DIAGNOSIS — F32.A ANXIETY AND DEPRESSION: ICD-10-CM

## 2023-01-04 RX ORDER — SERTRALINE HYDROCHLORIDE 100 MG/1
TABLET, FILM COATED ORAL
Qty: 30 TABLET | Refills: 11 | Status: SHIPPED | OUTPATIENT
Start: 2023-01-04

## 2023-01-21 ENCOUNTER — APPOINTMENT (OUTPATIENT)
Dept: LAB | Facility: CLINIC | Age: 48
End: 2023-01-21

## 2023-01-21 DIAGNOSIS — Z00.00 HEALTHCARE MAINTENANCE: ICD-10-CM

## 2023-01-21 LAB
ALBUMIN SERPL BCP-MCNC: 3.7 G/DL (ref 3.5–5)
ALP SERPL-CCNC: 56 U/L (ref 46–116)
ALT SERPL W P-5'-P-CCNC: 16 U/L (ref 12–78)
ANION GAP SERPL CALCULATED.3IONS-SCNC: 5 MMOL/L (ref 4–13)
AST SERPL W P-5'-P-CCNC: 13 U/L (ref 5–45)
BACTERIA UR QL AUTO: NORMAL /HPF
BASOPHILS # BLD AUTO: 0.07 THOUSANDS/ÂΜL (ref 0–0.1)
BASOPHILS NFR BLD AUTO: 1 % (ref 0–1)
BILIRUB SERPL-MCNC: 0.5 MG/DL (ref 0.2–1)
BILIRUB UR QL STRIP: NEGATIVE
BUN SERPL-MCNC: 13 MG/DL (ref 5–25)
CALCIUM SERPL-MCNC: 9.4 MG/DL (ref 8.3–10.1)
CHLORIDE SERPL-SCNC: 110 MMOL/L (ref 96–108)
CHOLEST SERPL-MCNC: 154 MG/DL
CLARITY UR: CLEAR
CO2 SERPL-SCNC: 26 MMOL/L (ref 21–32)
COLOR UR: ABNORMAL
CREAT SERPL-MCNC: 1.02 MG/DL (ref 0.6–1.3)
EOSINOPHIL # BLD AUTO: 0.23 THOUSAND/ÂΜL (ref 0–0.61)
EOSINOPHIL NFR BLD AUTO: 5 % (ref 0–6)
ERYTHROCYTE [DISTWIDTH] IN BLOOD BY AUTOMATED COUNT: 12.2 % (ref 11.6–15.1)
EST. AVERAGE GLUCOSE BLD GHB EST-MCNC: 103 MG/DL
GFR SERPL CREATININE-BSD FRML MDRD: 85 ML/MIN/1.73SQ M
GLUCOSE P FAST SERPL-MCNC: 91 MG/DL (ref 65–99)
GLUCOSE UR STRIP-MCNC: NEGATIVE MG/DL
HBA1C MFR BLD: 5.2 %
HCT VFR BLD AUTO: 45.2 % (ref 36.5–49.3)
HDLC SERPL-MCNC: 43 MG/DL
HGB BLD-MCNC: 15.4 G/DL (ref 12–17)
HGB UR QL STRIP.AUTO: NEGATIVE
IMM GRANULOCYTES # BLD AUTO: 0.01 THOUSAND/UL (ref 0–0.2)
IMM GRANULOCYTES NFR BLD AUTO: 0 % (ref 0–2)
KETONES UR STRIP-MCNC: NEGATIVE MG/DL
LDLC SERPL CALC-MCNC: 95 MG/DL (ref 0–100)
LEUKOCYTE ESTERASE UR QL STRIP: NEGATIVE
LYMPHOCYTES # BLD AUTO: 1.86 THOUSANDS/ÂΜL (ref 0.6–4.47)
LYMPHOCYTES NFR BLD AUTO: 37 % (ref 14–44)
MCH RBC QN AUTO: 29.6 PG (ref 26.8–34.3)
MCHC RBC AUTO-ENTMCNC: 34.1 G/DL (ref 31.4–37.4)
MCV RBC AUTO: 87 FL (ref 82–98)
MONOCYTES # BLD AUTO: 0.2 THOUSAND/ÂΜL (ref 0.17–1.22)
MONOCYTES NFR BLD AUTO: 4 % (ref 4–12)
NEUTROPHILS # BLD AUTO: 2.64 THOUSANDS/ÂΜL (ref 1.85–7.62)
NEUTS SEG NFR BLD AUTO: 53 % (ref 43–75)
NITRITE UR QL STRIP: NEGATIVE
NON-SQ EPI CELLS URNS QL MICRO: NORMAL /HPF
NRBC BLD AUTO-RTO: 0 /100 WBCS
PH UR STRIP.AUTO: 7.5 [PH]
PLATELET # BLD AUTO: 201 THOUSANDS/UL (ref 149–390)
PMV BLD AUTO: 10.8 FL (ref 8.9–12.7)
POTASSIUM SERPL-SCNC: 4 MMOL/L (ref 3.5–5.3)
PROT SERPL-MCNC: 7.1 G/DL (ref 6.4–8.4)
PROT UR STRIP-MCNC: ABNORMAL MG/DL
PSA SERPL-MCNC: 0.9 NG/ML (ref 0–4)
RBC # BLD AUTO: 5.21 MILLION/UL (ref 3.88–5.62)
RBC #/AREA URNS AUTO: NORMAL /HPF
SODIUM SERPL-SCNC: 141 MMOL/L (ref 135–147)
SP GR UR STRIP.AUTO: 1.02 (ref 1–1.03)
TRIGL SERPL-MCNC: 78 MG/DL
TSH SERPL DL<=0.05 MIU/L-ACNC: 1.27 UIU/ML (ref 0.45–4.5)
UROBILINOGEN UR STRIP-ACNC: <2 MG/DL
WBC # BLD AUTO: 5.01 THOUSAND/UL (ref 4.31–10.16)
WBC #/AREA URNS AUTO: NORMAL /HPF

## 2023-05-18 ENCOUNTER — APPOINTMENT (OUTPATIENT)
Dept: LAB | Facility: CLINIC | Age: 48
End: 2023-05-18

## 2023-05-18 DIAGNOSIS — D50.9 NORMOCYTIC HYPOCHROMIC ANEMIA: ICD-10-CM

## 2023-05-18 LAB
BASOPHILS # BLD AUTO: 0.05 THOUSANDS/ÂΜL (ref 0–0.1)
BASOPHILS NFR BLD AUTO: 1 % (ref 0–1)
EOSINOPHIL # BLD AUTO: 0.22 THOUSAND/ÂΜL (ref 0–0.61)
EOSINOPHIL NFR BLD AUTO: 5 % (ref 0–6)
ERYTHROCYTE [DISTWIDTH] IN BLOOD BY AUTOMATED COUNT: 13 % (ref 11.6–15.1)
FERRITIN SERPL-MCNC: 90 NG/ML (ref 24–336)
HCT VFR BLD AUTO: 45.3 % (ref 36.5–49.3)
HGB BLD-MCNC: 14.8 G/DL (ref 12–17)
IMM GRANULOCYTES # BLD AUTO: 0 THOUSAND/UL (ref 0–0.2)
IMM GRANULOCYTES NFR BLD AUTO: 0 % (ref 0–2)
LYMPHOCYTES # BLD AUTO: 1.62 THOUSANDS/ÂΜL (ref 0.6–4.47)
LYMPHOCYTES NFR BLD AUTO: 35 % (ref 14–44)
MCH RBC QN AUTO: 29.1 PG (ref 26.8–34.3)
MCHC RBC AUTO-ENTMCNC: 32.7 G/DL (ref 31.4–37.4)
MCV RBC AUTO: 89 FL (ref 82–98)
MONOCYTES # BLD AUTO: 0.35 THOUSAND/ÂΜL (ref 0.17–1.22)
MONOCYTES NFR BLD AUTO: 8 % (ref 4–12)
NEUTROPHILS # BLD AUTO: 2.34 THOUSANDS/ÂΜL (ref 1.85–7.62)
NEUTS SEG NFR BLD AUTO: 51 % (ref 43–75)
NRBC BLD AUTO-RTO: 0 /100 WBCS
PLATELET # BLD AUTO: 177 THOUSANDS/UL (ref 149–390)
PMV BLD AUTO: 11.5 FL (ref 8.9–12.7)
RBC # BLD AUTO: 5.08 MILLION/UL (ref 3.88–5.62)
WBC # BLD AUTO: 4.58 THOUSAND/UL (ref 4.31–10.16)

## 2023-05-19 LAB
IRON SATN MFR SERPL: 52 % (ref 20–50)
IRON SERPL-MCNC: 146 UG/DL (ref 65–175)
TIBC SERPL-MCNC: 280 UG/DL (ref 250–450)
TRANSFERRIN SERPL-MCNC: 227 MG/DL (ref 200–400)

## 2023-10-30 ENCOUNTER — OFFICE VISIT (OUTPATIENT)
Age: 48
End: 2023-10-30
Payer: COMMERCIAL

## 2023-10-30 VITALS
WEIGHT: 196 LBS | HEART RATE: 67 BPM | TEMPERATURE: 97.5 F | SYSTOLIC BLOOD PRESSURE: 110 MMHG | DIASTOLIC BLOOD PRESSURE: 68 MMHG | RESPIRATION RATE: 18 BRPM | BODY MASS INDEX: 25.86 KG/M2 | OXYGEN SATURATION: 98 %

## 2023-10-30 DIAGNOSIS — I20.89 ANGINA OF EFFORT: Primary | ICD-10-CM

## 2023-10-30 PROCEDURE — 93000 ELECTROCARDIOGRAM COMPLETE: CPT | Performed by: FAMILY MEDICINE

## 2023-10-30 PROCEDURE — 99214 OFFICE O/P EST MOD 30 MIN: CPT | Performed by: FAMILY MEDICINE

## 2023-10-30 NOTE — PROGRESS NOTES
Name: Akin Arias      : 1975      MRN: [id number]  Encounter Provider: Elly Ham DO  Encounter Date: 10/30/2023   Encounter department: [address]     1. Angina of effort- new onset. Family hx of CAD. Low risk pt (no htn, tobacco use, obesity, DM2) however ECG shows incomplete LBBB. Unknown if this is a new change on ecg since there are no studies to compare in chart. Needs further work up via stress test as soon as possible. Recommend starting 81mg ASA. ER precautions reviewed. -     Stress test only, exercise; Future; Expected date: 10/30/2023  -     Ambulatory Referral to Cardiology; Future             Subjective      Pt c/o Chest discomfort accompanied with mild breathless x 2 months. Occurs with exercise. Resolves with rest.   Woks as a . No hx of direct tobacco use. Hx of normal stress test over 10 years ago but was asymptomatic. Family hx signifant for massive mi (father in 46s)       Review of Systems   Constitutional:  Negative for fever. Respiratory:  Positive for chest tightness. Cardiovascular:  Positive for chest pain. Negative for leg swelling. Neurological:  Negative for dizziness, light-headedness and headaches. Current Outpatient Medications on File Prior to Visit   Medication Sig    famotidine (PEPCID) 40 MG tablet     ferrous sulfate 325 (65 Fe) mg tablet Take 325 mg by mouth daily    loperamide (IMODIUM) 2 mg capsule Take 2 mg by mouth    sertraline (ZOLOFT) 100 mg tablet TAKE 1 TABLET BY MOUTH EVERY DAY       Objective     /68 (BP Location: Left arm, Patient Position: Sitting, Cuff Size: Large)   Pulse 67   Temp 97.5 °F (36.4 °C) (Temporal)   Resp 18   Wt 88.9 kg (196 lb)   SpO2 98%   BMI 25.86 kg/m²     Physical Exam  Constitutional:       Appearance: He is normal weight. HENT:      Head: Normocephalic and atraumatic.    Cardiovascular:      Rate and Rhythm: Normal rate and regular rhythm. Heart sounds: No murmur heard. Pulmonary:      Effort: Pulmonary effort is normal.      Breath sounds: Normal breath sounds. Neurological:      Mental Status: He is alert and oriented to person, place, and time.    Psychiatric:         Mood and Affect: Mood normal.         Behavior: Behavior normal.       Maria De Jesus Vergara DO

## 2023-12-29 DIAGNOSIS — F41.9 ANXIETY AND DEPRESSION: ICD-10-CM

## 2023-12-29 DIAGNOSIS — F32.A ANXIETY AND DEPRESSION: ICD-10-CM

## 2023-12-29 RX ORDER — SERTRALINE HYDROCHLORIDE 100 MG/1
100 TABLET, FILM COATED ORAL DAILY
Qty: 30 TABLET | Refills: 11 | Status: SHIPPED | OUTPATIENT
Start: 2023-12-29

## 2024-01-02 ENCOUNTER — OFFICE VISIT (OUTPATIENT)
Age: 49
End: 2024-01-02
Payer: COMMERCIAL

## 2024-01-02 VITALS
DIASTOLIC BLOOD PRESSURE: 90 MMHG | BODY MASS INDEX: 25.58 KG/M2 | TEMPERATURE: 97.5 F | OXYGEN SATURATION: 97 % | HEART RATE: 71 BPM | HEIGHT: 73 IN | SYSTOLIC BLOOD PRESSURE: 130 MMHG | WEIGHT: 193 LBS

## 2024-01-02 DIAGNOSIS — F41.9 ANXIETY AND DEPRESSION: ICD-10-CM

## 2024-01-02 DIAGNOSIS — F32.A ANXIETY AND DEPRESSION: ICD-10-CM

## 2024-01-02 DIAGNOSIS — E89.0 H/O PARTIAL THYROIDECTOMY: ICD-10-CM

## 2024-01-02 DIAGNOSIS — E78.2 MIXED HYPERLIPIDEMIA: ICD-10-CM

## 2024-01-02 DIAGNOSIS — K21.9 GASTROESOPHAGEAL REFLUX DISEASE WITHOUT ESOPHAGITIS: ICD-10-CM

## 2024-01-02 DIAGNOSIS — Z00.00 ANNUAL PHYSICAL EXAM: Primary | ICD-10-CM

## 2024-01-02 PROCEDURE — 99214 OFFICE O/P EST MOD 30 MIN: CPT | Performed by: FAMILY MEDICINE

## 2024-01-02 PROCEDURE — 99396 PREV VISIT EST AGE 40-64: CPT | Performed by: FAMILY MEDICINE

## 2024-01-02 RX ORDER — PANTOPRAZOLE SODIUM 40 MG/1
40 TABLET, DELAYED RELEASE ORAL DAILY
Qty: 90 TABLET | Refills: 0 | Status: SHIPPED | OUTPATIENT
Start: 2024-01-02

## 2024-01-02 NOTE — PROGRESS NOTES
ADULT ANNUAL PHYSICAL  [facility] [place]    NAME: Bill Arias  AGE: 48 y.o. SEX: male  : 1975     DATE: 2024     Assessment and Plan:     Problem List Items Addressed This Visit       Mixed hyperlipidemia- diet controlled.     Gastroesophageal reflux disease without esophagitis- symptomatic on pepcid 40mg qd. Will switch to PPI. If no improvement consider Hpylori testing and refer to GI     Relevant Medications    pantoprazole (PROTONIX) 40 mg tablet    Anxiety and depression- well controlled with zoloft 50mg qd.     H/O partial thyroidectomy- hx of benign growth removed. Hasn't required thyroid replacement therapy but needs continued monitoring. Utd tsh ordered.      Other Visit Diagnoses       Annual physical exam    -  Primary    Relevant Orders    PSA, Total Screen    TSH + Free T4    Lipid Panel with Direct LDL reflex    Comprehensive metabolic panel                        Immunizations and preventive care screenings were discussed with patient today. Appropriate education was printed on patient's after visit summary.    Discussed risks and benefits of prostate cancer screening. We discussed the controversial history of PSA screening for prostate cancer in the [place] as well as the risk of over detection and over treatment of prostate cancer by way of PSA screening.  The patient understands that PSA blood testing is an imperfect way to screen for prostate cancer and that elevated PSA levels in the blood may also be caused by infection, inflammation, prostatic trauma or manipulation, urological procedures, or by benign prostatic enlargement.    The role of the digital rectal examination in prostate cancer screening was also discussed and I discussed with him that there is large interobserver variability in the findings of digital rectal examination.    Counseling:  Dental Health: discussed importance of regular tooth brushing,  flossing, and dental visits.         Return in about 3 months (around 2024) for Recheck/GERD .     Chief Complaint:     Chief Complaint   Patient presents with    \[facility] [place] [facility]\"" Care    Physical Exam      History of Present Illness:     Adult Annual Physical   Patient here for a comprehensive physical exam. The patient reports gerd no longer under congroll with pepcis.     Diet and Physical Activity  Diet/Nutrition: well balanced diet.   Exercise: walking.      Depression Screening  PHQ-2/9 Depression Screening    Little interest or pleasure in doing things: 0 - not at all  Feeling down, depressed, or hopeless: 0 - not at all  Trouble falling or staying asleep, or sleeping too much: 0 - not at all  Feeling tired or having little energy: 0 - not at all  Poor appetite or overeatin - not at all  Feeling bad about yourself - or that you are a failure or have let yourself or your family down: 0 - not at all  Trouble concentrating on things, such as reading the newspaper or watching television: 0 - not at all  Moving or speaking so slowly that other people could have noticed. Or the opposite - being so fidgety or restless that you have been moving around a lot more than usual: 0 - not at all  Thoughts that you would be better off dead, or of hurting yourself in some way: 0 - not at all  PHQ-9 Score: 0  PHQ-9 Interpretation: No or Minimal depression       General Health  Sleep: sleeps well.   Hearing: normal - bilateral.  Vision: wears glasses.   Dental: no dental visits for >1 year.        Health  Symptoms include: none         Review of Systems:     Review of Systems   Constitutional:  Negative for fever.   Respiratory:  Negative for shortness of breath.    Cardiovascular:  Negative for chest pain.   Gastrointestinal:  Positive for abdominal pain (epigastric.) and diarrhea.   Neurological:  Negative for headaches.      Past Medical History:     Past Medical History:   Diagnosis Date    Benign neoplasm of large  intestine     Disease of thyroid gland       Past Surgical History:     Past Surgical History:   Procedure Laterality Date    THYROIDECTOMY, PARTIAL        Family History:     Family History   Problem Relation Age of Onset    Coronary artery disease Father     Heart attack Father     Hypertension Father     No Known Problems Mother       Social History:     Social History     Socioeconomic History    Marital status: /Civil Union     Spouse name: None    Number of children: None    Years of education: None    Highest education level: None   Occupational History    None   Tobacco Use    Smoking status: Never    Smokeless tobacco: Never   Vaping Use    Vaping status: Never Used   Substance and Sexual Activity    Alcohol use: Yes     Alcohol/week: 2.0 standard drinks of alcohol     Types: 2 Cans of beer per week     Comment: Socially    Drug use: No    Sexual activity: Yes   Other Topics Concern    None   Social History Narrative    Always wear seat belt    Daily caffeine consumption     Social Determinants of Health     Financial Resource Strain: Not on file   Food Insecurity: Not on file   Transportation Needs: Not on file   Physical Activity: Not on file   Stress: Not on file   Social Connections: Not on file   Intimate Partner Violence: Not on file   Housing Stability: Not on file      Current Medications:     Current Outpatient Medications   Medication Sig Dispense Refill    famotidine (PEPCID) 40 MG tablet       loperamide (IMODIUM) 2 mg capsule Take 2 mg by mouth      pantoprazole (PROTONIX) 40 mg tablet Take 1 tablet (40 mg total) by mouth daily 90 tablet 0    sertraline (ZOLOFT) 100 mg tablet Take 1 tablet (100 mg total) by mouth daily 30 tablet 11    ferrous sulfate 325 (65 Fe) mg tablet Take 325 mg by mouth daily (Patient not taking: Reported on 1/2/2024)       No current facility-administered medications for this visit.      Allergies:     Allergies   Allergen Reactions    Ciprofloxacin Palpitations  "     Physical Exam:     /90 (BP Location: Left arm, Patient Position: Sitting, Cuff Size: Standard)   Pulse 71   Temp 97.5 °F (36.4 °C) (Tympanic Core)   Ht 6' 1\" (1.854 m)   Wt 87.5 kg (193 lb)   SpO2 97%   BMI 25.46 kg/m²     Physical Exam  HENT:      Head: Normocephalic and atraumatic.      Right Ear: External ear normal.      Left Ear: Tympanic membrane and external ear normal.      Mouth/Throat:      Mouth: Mucous membranes are moist.      Pharynx: Oropharynx is clear.   Eyes:      Extraocular Movements: Extraocular movements intact.      Conjunctiva/sclera: Conjunctivae normal.      Pupils: Pupils are equal, round, and reactive to light.   Cardiovascular:      Rate and Rhythm: Normal rate and regular rhythm.   Pulmonary:      Effort: Pulmonary effort is normal.      Breath sounds: Normal breath sounds.   Abdominal:      General: Bowel sounds are normal.      Palpations: Abdomen is soft.   Musculoskeletal:      Right lower leg: No edema.      Left lower leg: No edema.   Neurological:      Mental Status: He is alert and oriented to person, place, and time.   Psychiatric:         Mood and Affect: Mood normal.         Behavior: Behavior normal.          Cintia Restrepo,   [facility]IMARY [facility] [place]    "

## 2024-02-15 ENCOUNTER — APPOINTMENT (OUTPATIENT)
Age: 49
End: 2024-02-15
Payer: COMMERCIAL

## 2024-02-15 DIAGNOSIS — Z86.2 PERSONAL HISTORY OF DISEASES OF BLOOD AND BLOOD-FORMING ORGANS: ICD-10-CM

## 2024-02-15 DIAGNOSIS — Z00.00 ANNUAL PHYSICAL EXAM: ICD-10-CM

## 2024-02-15 LAB
FERRITIN SERPL-MCNC: 133 NG/ML (ref 24–336)
IRON SATN MFR SERPL: 35 % (ref 15–50)
IRON SERPL-MCNC: 112 UG/DL (ref 50–212)
PSA SERPL-MCNC: 1.58 NG/ML (ref 0–4)
T4 FREE SERPL-MCNC: 0.89 NG/DL (ref 0.61–1.12)
TIBC SERPL-MCNC: 316 UG/DL (ref 250–450)
TSH SERPL DL<=0.05 MIU/L-ACNC: 1.36 UIU/ML (ref 0.45–4.5)
UIBC SERPL-MCNC: 204 UG/DL (ref 155–355)

## 2024-02-15 PROCEDURE — G0103 PSA SCREENING: HCPCS

## 2024-02-15 PROCEDURE — 84439 ASSAY OF FREE THYROXINE: CPT

## 2024-02-15 PROCEDURE — 84443 ASSAY THYROID STIM HORMONE: CPT

## 2024-02-15 PROCEDURE — 36415 COLL VENOUS BLD VENIPUNCTURE: CPT

## 2024-02-15 PROCEDURE — 82728 ASSAY OF FERRITIN: CPT

## 2024-02-15 PROCEDURE — 83550 IRON BINDING TEST: CPT

## 2024-02-15 PROCEDURE — 83540 ASSAY OF IRON: CPT

## 2024-02-16 ENCOUNTER — TELEPHONE (OUTPATIENT)
Age: 49
End: 2024-02-16

## 2024-03-29 DIAGNOSIS — K21.9 GASTROESOPHAGEAL REFLUX DISEASE WITHOUT ESOPHAGITIS: ICD-10-CM

## 2024-03-29 RX ORDER — PANTOPRAZOLE SODIUM 40 MG/1
40 TABLET, DELAYED RELEASE ORAL DAILY
Qty: 30 TABLET | Refills: 2 | Status: SHIPPED | OUTPATIENT
Start: 2024-03-29

## 2024-05-16 ENCOUNTER — APPOINTMENT (OUTPATIENT)
Dept: LAB | Facility: CLINIC | Age: 49
End: 2024-05-16
Payer: COMMERCIAL

## 2024-05-16 DIAGNOSIS — Z00.00 ANNUAL PHYSICAL EXAM: ICD-10-CM

## 2024-05-16 LAB
ALBUMIN SERPL BCP-MCNC: 4.3 G/DL (ref 3.5–5)
ALP SERPL-CCNC: 58 U/L (ref 34–104)
ALT SERPL W P-5'-P-CCNC: 9 U/L (ref 7–52)
ANION GAP SERPL CALCULATED.3IONS-SCNC: 8 MMOL/L (ref 4–13)
AST SERPL W P-5'-P-CCNC: 12 U/L (ref 13–39)
BILIRUB SERPL-MCNC: 0.6 MG/DL (ref 0.2–1)
BUN SERPL-MCNC: 13 MG/DL (ref 5–25)
CALCIUM SERPL-MCNC: 9.4 MG/DL (ref 8.4–10.2)
CHLORIDE SERPL-SCNC: 104 MMOL/L (ref 96–108)
CHOLEST SERPL-MCNC: 161 MG/DL
CO2 SERPL-SCNC: 28 MMOL/L (ref 21–32)
CREAT SERPL-MCNC: 0.94 MG/DL (ref 0.6–1.3)
GFR SERPL CREATININE-BSD FRML MDRD: 94 ML/MIN/1.73SQ M
GLUCOSE P FAST SERPL-MCNC: 95 MG/DL (ref 65–99)
HDLC SERPL-MCNC: 37 MG/DL
LDLC SERPL CALC-MCNC: 104 MG/DL (ref 0–100)
POTASSIUM SERPL-SCNC: 3.8 MMOL/L (ref 3.5–5.3)
PROT SERPL-MCNC: 7 G/DL (ref 6.4–8.4)
SODIUM SERPL-SCNC: 140 MMOL/L (ref 135–147)
TRIGL SERPL-MCNC: 98 MG/DL

## 2024-05-16 PROCEDURE — 80061 LIPID PANEL: CPT

## 2024-05-16 PROCEDURE — 36415 COLL VENOUS BLD VENIPUNCTURE: CPT

## 2024-05-16 PROCEDURE — 80053 COMPREHEN METABOLIC PANEL: CPT

## 2024-05-17 ENCOUNTER — OFFICE VISIT (OUTPATIENT)
Age: 49
End: 2024-05-17
Payer: COMMERCIAL

## 2024-05-17 VITALS
HEART RATE: 71 BPM | WEIGHT: 193.6 LBS | RESPIRATION RATE: 18 BRPM | BODY MASS INDEX: 25.66 KG/M2 | SYSTOLIC BLOOD PRESSURE: 110 MMHG | OXYGEN SATURATION: 98 % | HEIGHT: 73 IN | DIASTOLIC BLOOD PRESSURE: 70 MMHG | TEMPERATURE: 97.6 F

## 2024-05-17 DIAGNOSIS — E78.2 MIXED HYPERLIPIDEMIA: ICD-10-CM

## 2024-05-17 DIAGNOSIS — K21.9 GASTROESOPHAGEAL REFLUX DISEASE WITHOUT ESOPHAGITIS: Primary | ICD-10-CM

## 2024-05-17 DIAGNOSIS — F41.9 ANXIETY AND DEPRESSION: ICD-10-CM

## 2024-05-17 DIAGNOSIS — F32.A ANXIETY AND DEPRESSION: ICD-10-CM

## 2024-05-17 PROCEDURE — 99214 OFFICE O/P EST MOD 30 MIN: CPT | Performed by: FAMILY MEDICINE

## 2024-05-17 NOTE — PROGRESS NOTES
"Ambulatory Visit  Name: Bill Arias      : 1975      MRN: [id number]  Encounter Provider: Cintia Restrepo DO  Encounter Date: 2024   Encounter department: [facility] [place]    Assessment & Plan   1. Gastroesophageal reflux disease without esophagitis-well-controlled with daily PPI.  Patient has not needed as needed H2 blocker nightly but has as a option per GI recommendation.  2. Mixed hyperlipidemia-well controlled via diet  3. Anxiety and depression-patient has been cutting 100 mg tablets in half.  Symptoms have been stable on 50 mg.  Will titrate dose down to 50 mg tablets daily  -     sertraline (ZOLOFT) 50 mg tablet; Take 1 tablet (50 mg total) by mouth daily       History of Present Illness     Patient presents for follow-up.  Discussed GERD related symptoms.  They have been absent since the start of PPI.  Patient also recently seen GI.  Discussed that visit.  Reviewed laboratory studies with patient        Review of Systems   Constitutional:  Negative for fever.   Cardiovascular:  Negative for chest pain and palpitations.   Gastrointestinal:  Negative for abdominal pain.       Objective     /70 (BP Location: Left arm, Patient Position: Sitting, Cuff Size: Standard)   Pulse 71   Temp 97.6 °F (36.4 °C) (Tympanic)   Resp 18   Ht 6' 1\" (1.854 m)   Wt 87.8 kg (193 lb 9.6 oz)   SpO2 98%   BMI 25.54 kg/m²     Physical Exam  HENT:      Head: Normocephalic.      Right Ear: Tympanic membrane and external ear normal.      Left Ear: Tympanic membrane and external ear normal.   Eyes:      Conjunctiva/sclera: Conjunctivae normal.   Cardiovascular:      Rate and Rhythm: Normal rate and regular rhythm.   Pulmonary:      Effort: Pulmonary effort is normal.      Breath sounds: Normal breath sounds.   Abdominal:      General: Bowel sounds are normal.      Palpations: Abdomen is soft.   Neurological:      Mental Status: He is alert and oriented to person, place, and " time.      Gait: Gait normal.   Psychiatric:         Mood and Affect: Mood normal.         Behavior: Behavior normal.

## 2025-01-02 ENCOUNTER — OFFICE VISIT (OUTPATIENT)
Age: 50
End: 2025-01-02
Payer: COMMERCIAL

## 2025-01-02 VITALS
OXYGEN SATURATION: 98 % | SYSTOLIC BLOOD PRESSURE: 120 MMHG | DIASTOLIC BLOOD PRESSURE: 80 MMHG | BODY MASS INDEX: 26.61 KG/M2 | WEIGHT: 200.8 LBS | TEMPERATURE: 97.5 F | HEART RATE: 74 BPM | RESPIRATION RATE: 16 BRPM | HEIGHT: 73 IN

## 2025-01-02 DIAGNOSIS — F32.A ANXIETY AND DEPRESSION: ICD-10-CM

## 2025-01-02 DIAGNOSIS — Z23 ENCOUNTER FOR IMMUNIZATION: ICD-10-CM

## 2025-01-02 DIAGNOSIS — M72.2 PLANTAR FASCIITIS OF LEFT FOOT: ICD-10-CM

## 2025-01-02 DIAGNOSIS — F41.9 ANXIETY AND DEPRESSION: ICD-10-CM

## 2025-01-02 DIAGNOSIS — H91.93 DECREASED HEARING OF BOTH EARS: ICD-10-CM

## 2025-01-02 DIAGNOSIS — K21.9 GASTROESOPHAGEAL REFLUX DISEASE WITHOUT ESOPHAGITIS: ICD-10-CM

## 2025-01-02 DIAGNOSIS — Z12.5 PROSTATE CANCER SCREENING: ICD-10-CM

## 2025-01-02 DIAGNOSIS — Z00.00 ANNUAL PHYSICAL EXAM: Primary | ICD-10-CM

## 2025-01-02 DIAGNOSIS — K58.0 IRRITABLE BOWEL SYNDROME WITH DIARRHEA: ICD-10-CM

## 2025-01-02 DIAGNOSIS — E89.0 H/O PARTIAL THYROIDECTOMY: ICD-10-CM

## 2025-01-02 PROBLEM — M54.2 CERVICALGIA: Status: RESOLVED | Noted: 2021-10-27 | Resolved: 2025-01-02

## 2025-01-02 PROBLEM — R41.89 BRAIN FOG: Status: RESOLVED | Noted: 2021-08-07 | Resolved: 2025-01-02

## 2025-01-02 PROCEDURE — 90673 RIV3 VACCINE NO PRESERV IM: CPT | Performed by: FAMILY MEDICINE

## 2025-01-02 PROCEDURE — 99396 PREV VISIT EST AGE 40-64: CPT | Performed by: FAMILY MEDICINE

## 2025-01-02 PROCEDURE — 90471 IMMUNIZATION ADMIN: CPT | Performed by: FAMILY MEDICINE

## 2025-01-02 PROCEDURE — 99214 OFFICE O/P EST MOD 30 MIN: CPT | Performed by: FAMILY MEDICINE

## 2025-01-02 RX ORDER — SERTRALINE HYDROCHLORIDE 25 MG/1
25 TABLET, FILM COATED ORAL DAILY
Qty: 30 TABLET | Refills: 0 | Status: SHIPPED | OUTPATIENT
Start: 2025-01-02

## 2025-01-02 NOTE — ASSESSMENT & PLAN NOTE
Well-controlled with daily Protonix.   intermittently uses Pepcid but very infrequently.  Patient to continue..  Follows with GI

## 2025-01-02 NOTE — ASSESSMENT & PLAN NOTE
Long-term history of SSRI use for anxiety.  Successfully weaned from 100 mg to 50 mg last year.  Circumstances continue to improve in life so is willing to continue to wean to the most effective dose.  Will start 25 mg daily    Orders:    sertraline (ZOLOFT) 25 mg tablet; Take 1 tablet (25 mg total) by mouth daily

## 2025-01-02 NOTE — ASSESSMENT & PLAN NOTE
History of benign growth greater than 3 years ago.  Has not required any hormone replacement therapy.  Will continue to monitor with an UTD TSH  Orders:    TSH, 3rd generation with Free T4 reflex; Future

## 2025-01-02 NOTE — PROGRESS NOTES
Adult Annual Physical  Name: Bill Arias      : 1975      MRN: [id number]  Encounter Provider: Cintia Restrepo DO  Encounter Date: 2025   Encounter department: [facility]IMARY [facility] [place]    Assessment & Plan  Annual physical exam    Orders:    Comprehensive metabolic panel; Future    Lipid Panel with Direct LDL reflex; Future    CBC and differential; Future    Anxiety and depression  Long-term history of SSRI use for anxiety.  Successfully weaned from 100 mg to 50 mg last year.  Circumstances continue to improve in life so is willing to continue to wean to the most effective dose.  Will start 25 mg daily    Orders:    sertraline (ZOLOFT) 25 mg tablet; Take 1 tablet (25 mg total) by mouth daily    Plantar fasciitis of left foot  Conservative management reviewed such as NSAIDs, warm foot soaks and exercises which were provided to patient instructions, and continued foot rolling.       Gastroesophageal reflux disease without esophagitis  Well-controlled with daily Protonix.   intermittently uses Pepcid but very infrequently.  Patient to continue..  Follows with GI       H/O partial thyroidectomy  History of benign growth greater than 3 years ago.  Has not required any hormone replacement therapy.  Will continue to monitor with an UTD TSH  Orders:    TSH, 3rd generation with Free T4 reflex; Future    Decreased hearing of both ears  Gradual decrease noticed over the past +5 years.  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  Denies otalgia or worsening tinnitus.  Would like screening.  Order placed.  Orders:    Audiogram screen; Future    Prostate cancer screening    Orders:    PSA, Total Screen; Future    Irritable bowel syndrome with diarrhea  Stable with as needed Imodium and removed avoiding dietary triggers.  Follows with GI       Encounter for immunization    Orders:    influenza vaccine, recombinant, PF, 0.5 mL IM (Flublok)      Immunizations and preventive care  screenings were discussed with patient today. Appropriate education was printed on patient's after visit summary.    Discussed risks and benefits of prostate cancer screening. We discussed the controversial history of PSA screening for prostate cancer in the [place] as well as the risk of over detection and over treatment of prostate cancer by way of PSA screening.  The patient understands that PSA blood testing is an imperfect way to screen for prostate cancer and that elevated PSA levels in the blood may also be caused by infection, inflammation, prostatic trauma or manipulation, urological procedures, or by benign prostatic enlargement.    The role of the digital rectal examination in prostate cancer screening was also discussed and I discussed with him that there is large interobserver variability in the findings of digital rectal examination.    Counseling:  Injury prevention: discussed safety/seat belts, safety helmets, smoke detectors, carbon monoxide detectors, and smoking near bedding or upholstery.      Depression Screening and Follow-up Plan: Patient was screened for depression during today's encounter. They screened negative with a PHQ-9 score of 0.        History of Present Illness         Adult Annual Physical:  Patient presents for annual physical. Overall reports doing well.  Complaining of left heel pain worse after first waking for the day.  Radiates into the middle of the foot.  Alleviated slightly with foot rolling against a firm object.  Denies weakness, swelling, redness, or gait instability.  Discussed SSRIs and anxiety.     Diet and Physical Activity:  - Diet/Nutrition: well balanced diet.  - Exercise: walking.    Depression Screening:    - PHQ-9 Score: 0    General Health:  - Sleep: 7-8 hours of sleep on average.  - Hearing: decreased hearing bilateral ears and tinnitus.  - Vision: goes for regular eye exams.  - Dental: regular dental visits.     Health:  - History of STDs: no.   -  "Urinary symptoms: none.     Review of Systems   Constitutional:  Negative for fever.   Respiratory:  Negative for shortness of breath.    Cardiovascular:  Negative for chest pain.   Gastrointestinal:  Negative for constipation and diarrhea.   Musculoskeletal:  Positive for arthralgias.         Objective   /80 (BP Location: Left arm, Patient Position: Sitting, Cuff Size: Standard)   Pulse 74   Temp 97.5 °F (36.4 °C) (Tympanic)   Resp 16   Ht 6' 1\" (1.854 m)   Wt 91.1 kg (200 lb 12.8 oz)   SpO2 98%   BMI 26.49 kg/m²     Physical Exam  Constitutional:       Appearance: Normal appearance.   HENT:      Head: Normocephalic and atraumatic.      Right Ear: Tympanic membrane and external ear normal.      Left Ear: Tympanic membrane and external ear normal.   Eyes:      Extraocular Movements: Extraocular movements intact.      Conjunctiva/sclera: Conjunctivae normal.      Pupils: Pupils are equal, round, and reactive to light.   Cardiovascular:      Rate and Rhythm: Normal rate and regular rhythm.      Heart sounds: No murmur heard.  Pulmonary:      Effort: Pulmonary effort is normal.      Breath sounds: Normal breath sounds.   Abdominal:      General: Abdomen is flat. Bowel sounds are normal.      Palpations: Abdomen is soft.   Musculoskeletal:      Right lower leg: No edema.      Left lower leg: No edema.   Neurological:      Mental Status: He is alert and oriented to person, place, and time.      Gait: Gait normal.   Psychiatric:         Mood and Affect: Mood normal.         Behavior: Behavior normal.         "

## 2025-01-02 NOTE — PATIENT INSTRUCTIONS
"Patient Education     Routine physical for adults   The Basics   Written by the doctors and editors at [facility]   What is a physical? -- A physical is a routine visit, or \"check-up,\" with your doctor. You might also hear it called a \"wellness visit\" or \"preventive visit.\"  During each visit, the doctor will:   Ask about your physical and mental health   Ask about your habits, behaviors, and lifestyle   Do an exam   Give you vaccines if needed   Talk to you about any medicines you take   Give advice about your health   Answer your questions  Getting regular check-ups is an important part of taking care of your health. It can help your doctor find and treat any problems you have. But it's also important for preventing health problems.  A routine physical is different from a \"sick visit.\" A sick visit is when you see a doctor because of a health concern or problem. Since physicals are scheduled ahead of time, you can think about what you want to ask the doctor.  How often should I get a physical? -- It depends on your age and health. In general, for people age 21 years and older:   If you are younger than 50 years, you might be able to get a physical every 3 years.   If you are 50 years or older, your doctor might recommend a physical every year.  If you have an ongoing health condition, like diabetes or high blood pressure, your doctor will probably want to see you more often.  What happens during a physical? -- In general, each visit will include:   Physical exam - The doctor or nurse will check your height, weight, heart rate, and blood pressure. They will also look at your eyes and ears. They will ask about how you are feeling and whether you have any symptoms that bother you.   Medicines - It's a good idea to bring a list of all the medicines you take to each doctor visit. Your doctor will talk to you about your medicines and answer any questions. Tell them if you are having any side effects that bother you. You " "should also tell them if you are having trouble paying for any of your medicines.   Habits and behaviors - This includes:   Your diet   Your exercise habits   Whether you smoke, drink alcohol, or use drugs   Whether you are sexually active   Whether you feel safe at home  Your doctor will talk to you about things you can do to improve your health and lower your risk of health problems. They will also offer help and support. For example, if you want to quit smoking, they can give you advice and might prescribe medicines. If you want to improve your diet or get more physical activity, they can help you with this, too.   Lab tests, if needed - The tests you get will depend on your age and situation. For example, your doctor might want to check your:   Cholesterol   Blood sugar   Iron level   Vaccines - The recommended vaccines will depend on your age, health, and what vaccines you already had. Vaccines are very important because they can prevent certain serious or deadly infections.   Discussion of screening - \"Screening\" means checking for diseases or other health problems before they cause symptoms. Your doctor can recommend screening based on your age, risk, and preferences. This might include tests to check for:   Cancer, such as breast, prostate, cervical, ovarian, colorectal, prostate, lung, or skin cancer   Sexually transmitted infections, such as chlamydia and gonorrhea   Mental health conditions like depression and anxiety  Your doctor will talk to you about the different types of screening tests. They can help you decide which screenings to have. They can also explain what the results might mean.   Answering questions - The physical is a good time to ask the doctor or nurse questions about your health. If needed, they can refer you to other doctors or specialists, too.  Adults older than 65 years often need other care, too. As you get older, your doctor will talk to you about:   How to prevent falling at " home   Hearing or vision tests   Memory testing   How to take your medicines safely   Making sure that you have the help and support you need at home  All topics are updated as new evidence becomes available and our peer review process is complete.  This topic retrieved from [employer] on: May 02, 2024.  Topic 465971 Version 1.0  Release: 32.4.3 - C32.122  © 2024 UpToDate, Inc. and/or its affiliates. All rights reserved.  Consumer Information Use and Disclaimer   Disclaimer: This generalized information is a limited summary of diagnosis, treatment, and/or medication information. It is not meant to be comprehensive and should be used as a tool to help the user understand and/or assess potential diagnostic and treatment options. It does NOT include all information about conditions, treatments, medications, side effects, or risks that may apply to a specific patient. It is not intended to be medical advice or a substitute for the medical advice, diagnosis, or treatment of a health care provider based on the health care provider's examination and assessment of a patient's specific and unique circumstances. Patients must speak with a health care provider for complete information about their health, medical questions, and treatment options, including any risks or benefits regarding use of medications. This information does not endorse any treatments or medications as safe, effective, or approved for treating a specific patient. UpToDate, Inc. and its affiliates disclaim any warranty or liability relating to this information or the use thereof.The use of this information is governed by the Terms of Use, available at https://www.woltersWefunderuwer.com/en/know/clinical-effectiveness-terms. 2024© UpToDate, Inc. and its affiliates and/or licensors. All rights reserved.  Copyright   © 2024 UpToDate, Inc. and/or its affiliates. All rights reserved.

## 2025-01-08 ENCOUNTER — APPOINTMENT (OUTPATIENT)
Dept: LAB | Facility: CLINIC | Age: 50
End: 2025-01-08
Payer: COMMERCIAL

## 2025-01-08 DIAGNOSIS — E89.0 H/O PARTIAL THYROIDECTOMY: ICD-10-CM

## 2025-01-08 DIAGNOSIS — Z00.00 ANNUAL PHYSICAL EXAM: ICD-10-CM

## 2025-01-08 DIAGNOSIS — Z12.5 PROSTATE CANCER SCREENING: ICD-10-CM

## 2025-01-08 LAB
ALBUMIN SERPL BCG-MCNC: 4.2 G/DL (ref 3.5–5)
ALP SERPL-CCNC: 51 U/L (ref 34–104)
ALT SERPL W P-5'-P-CCNC: 11 U/L (ref 7–52)
ANION GAP SERPL CALCULATED.3IONS-SCNC: 9 MMOL/L (ref 4–13)
AST SERPL W P-5'-P-CCNC: 14 U/L (ref 13–39)
BASOPHILS # BLD AUTO: 0.06 THOUSANDS/ΜL (ref 0–0.1)
BASOPHILS NFR BLD AUTO: 1 % (ref 0–1)
BILIRUB SERPL-MCNC: 0.68 MG/DL (ref 0.2–1)
BUN SERPL-MCNC: 18 MG/DL (ref 5–25)
CALCIUM SERPL-MCNC: 9.3 MG/DL (ref 8.4–10.2)
CHLORIDE SERPL-SCNC: 106 MMOL/L (ref 96–108)
CHOLEST SERPL-MCNC: 182 MG/DL (ref ?–200)
CO2 SERPL-SCNC: 28 MMOL/L (ref 21–32)
CREAT SERPL-MCNC: 0.88 MG/DL (ref 0.6–1.3)
EOSINOPHIL # BLD AUTO: 0.26 THOUSAND/ΜL (ref 0–0.61)
EOSINOPHIL NFR BLD AUTO: 5 % (ref 0–6)
ERYTHROCYTE [DISTWIDTH] IN BLOOD BY AUTOMATED COUNT: 12.1 % (ref 11.6–15.1)
GFR SERPL CREATININE-BSD FRML MDRD: 99 ML/MIN/1.73SQ M
GLUCOSE P FAST SERPL-MCNC: 89 MG/DL (ref 65–99)
HCT VFR BLD AUTO: 46.7 % (ref 36.5–49.3)
HDLC SERPL-MCNC: 42 MG/DL
HGB BLD-MCNC: 15.5 G/DL (ref 12–17)
IMM GRANULOCYTES # BLD AUTO: 0.02 THOUSAND/UL (ref 0–0.2)
IMM GRANULOCYTES NFR BLD AUTO: 0 % (ref 0–2)
LDLC SERPL CALC-MCNC: 119 MG/DL (ref 0–100)
LYMPHOCYTES # BLD AUTO: 1.98 THOUSANDS/ΜL (ref 0.6–4.47)
LYMPHOCYTES NFR BLD AUTO: 36 % (ref 14–44)
MCH RBC QN AUTO: 29.2 PG (ref 26.8–34.3)
MCHC RBC AUTO-ENTMCNC: 33.2 G/DL (ref 31.4–37.4)
MCV RBC AUTO: 88 FL (ref 82–98)
MONOCYTES # BLD AUTO: 0.26 THOUSAND/ΜL (ref 0.17–1.22)
MONOCYTES NFR BLD AUTO: 5 % (ref 4–12)
NEUTROPHILS # BLD AUTO: 2.93 THOUSANDS/ΜL (ref 1.85–7.62)
NEUTS SEG NFR BLD AUTO: 53 % (ref 43–75)
NRBC BLD AUTO-RTO: 0 /100 WBCS
PLATELET # BLD AUTO: 195 THOUSANDS/UL (ref 149–390)
PMV BLD AUTO: 11.4 FL (ref 8.9–12.7)
POTASSIUM SERPL-SCNC: 4.2 MMOL/L (ref 3.5–5.3)
PROT SERPL-MCNC: 6.8 G/DL (ref 6.4–8.4)
RBC # BLD AUTO: 5.31 MILLION/UL (ref 3.88–5.62)
SODIUM SERPL-SCNC: 143 MMOL/L (ref 135–147)
TRIGL SERPL-MCNC: 103 MG/DL (ref ?–150)
TSH SERPL DL<=0.05 MIU/L-ACNC: 1.49 UIU/ML (ref 0.45–4.5)
WBC # BLD AUTO: 5.51 THOUSAND/UL (ref 4.31–10.16)

## 2025-01-08 PROCEDURE — 36415 COLL VENOUS BLD VENIPUNCTURE: CPT

## 2025-01-08 PROCEDURE — 80061 LIPID PANEL: CPT

## 2025-01-08 PROCEDURE — 80053 COMPREHEN METABOLIC PANEL: CPT

## 2025-01-08 PROCEDURE — 84443 ASSAY THYROID STIM HORMONE: CPT

## 2025-01-08 PROCEDURE — 85025 COMPLETE CBC W/AUTO DIFF WBC: CPT

## 2025-01-15 ENCOUNTER — RESULTS FOLLOW-UP (OUTPATIENT)
Age: 50
End: 2025-01-15

## 2025-01-15 NOTE — TELEPHONE ENCOUNTER
----- Message from Cintia Restrepo DO sent at 1/15/2025  9:43 AM EST -----  Please notify patient that overall his blood work looks really good.  His cholesterol levels are nearly perfect and all other labs were normal

## 2025-02-05 DIAGNOSIS — F41.9 ANXIETY AND DEPRESSION: ICD-10-CM

## 2025-02-05 DIAGNOSIS — F32.A ANXIETY AND DEPRESSION: ICD-10-CM

## 2025-02-06 RX ORDER — SERTRALINE HYDROCHLORIDE 25 MG/1
25 TABLET, FILM COATED ORAL DAILY
Qty: 90 TABLET | Refills: 0 | Status: SHIPPED | OUTPATIENT
Start: 2025-02-06

## 2025-04-11 ENCOUNTER — APPOINTMENT (OUTPATIENT)
Dept: LAB | Facility: CLINIC | Age: 50
End: 2025-04-11
Payer: COMMERCIAL

## 2025-04-11 DIAGNOSIS — Z79.899 DRUG THERAPY: ICD-10-CM

## 2025-04-11 LAB
ALBUMIN SERPL BCG-MCNC: 4.4 G/DL (ref 3.5–5)
ALP SERPL-CCNC: 58 U/L (ref 34–104)
ALT SERPL W P-5'-P-CCNC: 13 U/L (ref 7–52)
ANION GAP SERPL CALCULATED.3IONS-SCNC: 6 MMOL/L (ref 4–13)
AST SERPL W P-5'-P-CCNC: 15 U/L (ref 13–39)
BILIRUB SERPL-MCNC: 0.75 MG/DL (ref 0.2–1)
BUN SERPL-MCNC: 15 MG/DL (ref 5–25)
CALCIUM SERPL-MCNC: 9.6 MG/DL (ref 8.4–10.2)
CHLORIDE SERPL-SCNC: 104 MMOL/L (ref 96–108)
CO2 SERPL-SCNC: 31 MMOL/L (ref 21–32)
CREAT SERPL-MCNC: 0.94 MG/DL (ref 0.6–1.3)
GFR SERPL CREATININE-BSD FRML MDRD: 93 ML/MIN/1.73SQ M
GLUCOSE P FAST SERPL-MCNC: 96 MG/DL (ref 65–99)
POTASSIUM SERPL-SCNC: 4.2 MMOL/L (ref 3.5–5.3)
PROT SERPL-MCNC: 7.1 G/DL (ref 6.4–8.4)
PSA SERPL-MCNC: 1.39 NG/ML (ref 0–4)
SODIUM SERPL-SCNC: 141 MMOL/L (ref 135–147)

## 2025-04-11 PROCEDURE — 80053 COMPREHEN METABOLIC PANEL: CPT

## 2025-04-11 PROCEDURE — G0103 PSA SCREENING: HCPCS

## 2025-05-03 DIAGNOSIS — F32.A ANXIETY AND DEPRESSION: ICD-10-CM

## 2025-05-03 DIAGNOSIS — F41.9 ANXIETY AND DEPRESSION: ICD-10-CM

## 2025-05-04 RX ORDER — SERTRALINE HYDROCHLORIDE 25 MG/1
25 TABLET, FILM COATED ORAL DAILY
Qty: 30 TABLET | Refills: 2 | Status: SHIPPED | OUTPATIENT
Start: 2025-05-04

## 2025-07-31 DIAGNOSIS — F41.9 ANXIETY AND DEPRESSION: ICD-10-CM

## 2025-07-31 DIAGNOSIS — F32.A ANXIETY AND DEPRESSION: ICD-10-CM

## 2025-07-31 RX ORDER — SERTRALINE HYDROCHLORIDE 25 MG/1
25 TABLET, FILM COATED ORAL DAILY
Qty: 30 TABLET | Refills: 5 | Status: SHIPPED | OUTPATIENT
Start: 2025-07-31